# Patient Record
Sex: FEMALE | Race: WHITE | NOT HISPANIC OR LATINO
[De-identification: names, ages, dates, MRNs, and addresses within clinical notes are randomized per-mention and may not be internally consistent; named-entity substitution may affect disease eponyms.]

---

## 2017-01-20 ENCOUNTER — APPOINTMENT (OUTPATIENT)
Dept: CARDIOLOGY | Facility: CLINIC | Age: 72
End: 2017-01-20

## 2017-06-13 ENCOUNTER — APPOINTMENT (OUTPATIENT)
Dept: PODIATRY | Facility: CLINIC | Age: 72
End: 2017-06-13

## 2017-06-13 ENCOUNTER — OUTPATIENT (OUTPATIENT)
Dept: OUTPATIENT SERVICES | Facility: HOSPITAL | Age: 72
LOS: 1 days | Discharge: HOME | End: 2017-06-13

## 2017-06-28 DIAGNOSIS — B35.1 TINEA UNGUIUM: ICD-10-CM

## 2018-02-27 ENCOUNTER — INPATIENT (INPATIENT)
Facility: HOSPITAL | Age: 73
LOS: 2 days | Discharge: HOME | End: 2018-03-02
Attending: INTERNAL MEDICINE | Admitting: INTERNAL MEDICINE

## 2018-02-27 VITALS
TEMPERATURE: 99 F | DIASTOLIC BLOOD PRESSURE: 72 MMHG | RESPIRATION RATE: 18 BRPM | WEIGHT: 178.57 LBS | HEART RATE: 95 BPM | SYSTOLIC BLOOD PRESSURE: 150 MMHG | OXYGEN SATURATION: 96 %

## 2018-02-27 DIAGNOSIS — Z98.890 OTHER SPECIFIED POSTPROCEDURAL STATES: Chronic | ICD-10-CM

## 2018-02-27 LAB
ALBUMIN SERPL ELPH-MCNC: 3.4 G/DL — SIGNIFICANT CHANGE UP (ref 3–5.5)
ALP SERPL-CCNC: 107 U/L — SIGNIFICANT CHANGE UP (ref 30–115)
ALT FLD-CCNC: 16 U/L — SIGNIFICANT CHANGE UP (ref 0–41)
ANION GAP SERPL CALC-SCNC: 10 MMOL/L — SIGNIFICANT CHANGE UP (ref 7–14)
APTT BLD: 30.3 SEC — SIGNIFICANT CHANGE UP (ref 27–39.2)
AST SERPL-CCNC: 47 U/L — HIGH (ref 0–41)
BILIRUB SERPL-MCNC: 1 MG/DL — SIGNIFICANT CHANGE UP (ref 0.2–1.2)
BUN SERPL-MCNC: 23 MG/DL — HIGH (ref 10–20)
CALCIUM SERPL-MCNC: 8.9 MG/DL — SIGNIFICANT CHANGE UP (ref 8.5–10.1)
CHLORIDE SERPL-SCNC: 100 MMOL/L — SIGNIFICANT CHANGE UP (ref 98–110)
CK MB CFR SERPL CALC: 41.4 NG/ML — HIGH (ref 0.6–6.3)
CO2 SERPL-SCNC: 23 MMOL/L — SIGNIFICANT CHANGE UP (ref 17–32)
CREAT SERPL-MCNC: 1.2 MG/DL — SIGNIFICANT CHANGE UP (ref 0.7–1.5)
GLUCOSE SERPL-MCNC: 163 MG/DL — HIGH (ref 70–110)
INR BLD: 0.98 RATIO — SIGNIFICANT CHANGE UP (ref 0.65–1.3)
INR BLD: 1.01 RATIO — SIGNIFICANT CHANGE UP (ref 0.65–1.3)
LACTATE SERPL-SCNC: 1.5 MMOL/L — SIGNIFICANT CHANGE UP (ref 0.5–2.2)
POTASSIUM SERPL-MCNC: 5 MMOL/L — SIGNIFICANT CHANGE UP (ref 3.5–5)
POTASSIUM SERPL-SCNC: 5 MMOL/L — SIGNIFICANT CHANGE UP (ref 3.5–5)
PROT SERPL-MCNC: 6.9 G/DL — SIGNIFICANT CHANGE UP (ref 6–8)
PROTHROM AB SERPL-ACNC: 10.6 SEC — SIGNIFICANT CHANGE UP (ref 9.95–12.87)
PROTHROM AB SERPL-ACNC: 10.9 SEC — SIGNIFICANT CHANGE UP (ref 9.95–12.87)
SODIUM SERPL-SCNC: 133 MMOL/L — LOW (ref 135–146)
TROPONIN I SERPL-MCNC: 9.1 NG/ML — CRITICAL HIGH (ref 0–0.05)

## 2018-02-27 RX ORDER — DEXTROSE 50 % IN WATER 50 %
25 SYRINGE (ML) INTRAVENOUS ONCE
Qty: 0 | Refills: 0 | Status: DISCONTINUED | OUTPATIENT
Start: 2018-02-27 | End: 2018-03-02

## 2018-02-27 RX ORDER — INSULIN GLARGINE 100 [IU]/ML
15 INJECTION, SOLUTION SUBCUTANEOUS AT BEDTIME
Qty: 0 | Refills: 0 | Status: DISCONTINUED | OUTPATIENT
Start: 2018-02-27 | End: 2018-03-02

## 2018-02-27 RX ORDER — CLOPIDOGREL BISULFATE 75 MG/1
300 TABLET, FILM COATED ORAL ONCE
Qty: 0 | Refills: 0 | Status: COMPLETED | OUTPATIENT
Start: 2018-02-27 | End: 2018-02-27

## 2018-02-27 RX ORDER — ASPIRIN/CALCIUM CARB/MAGNESIUM 324 MG
325 TABLET ORAL ONCE
Qty: 0 | Refills: 0 | Status: COMPLETED | OUTPATIENT
Start: 2018-02-27 | End: 2018-02-27

## 2018-02-27 RX ORDER — DEXTROSE 50 % IN WATER 50 %
12.5 SYRINGE (ML) INTRAVENOUS ONCE
Qty: 0 | Refills: 0 | Status: DISCONTINUED | OUTPATIENT
Start: 2018-02-27 | End: 2018-03-02

## 2018-02-27 RX ORDER — HEPARIN SODIUM 5000 [USP'U]/ML
INJECTION INTRAVENOUS; SUBCUTANEOUS
Qty: 25000 | Refills: 0 | Status: DISCONTINUED | OUTPATIENT
Start: 2018-02-27 | End: 2018-02-28

## 2018-02-27 RX ORDER — INSULIN LISPRO 100/ML
5 VIAL (ML) SUBCUTANEOUS
Qty: 0 | Refills: 0 | Status: DISCONTINUED | OUTPATIENT
Start: 2018-02-27 | End: 2018-03-02

## 2018-02-27 RX ORDER — GLUCAGON INJECTION, SOLUTION 0.5 MG/.1ML
1 INJECTION, SOLUTION SUBCUTANEOUS ONCE
Qty: 0 | Refills: 0 | Status: DISCONTINUED | OUTPATIENT
Start: 2018-02-27 | End: 2018-03-02

## 2018-02-27 RX ORDER — METOPROLOL TARTRATE 50 MG
25 TABLET ORAL
Qty: 0 | Refills: 0 | Status: DISCONTINUED | OUTPATIENT
Start: 2018-02-27 | End: 2018-03-02

## 2018-02-27 RX ORDER — ALBUTEROL 90 UG/1
2 AEROSOL, METERED ORAL EVERY 6 HOURS
Qty: 0 | Refills: 0 | Status: DISCONTINUED | OUTPATIENT
Start: 2018-02-27 | End: 2018-03-02

## 2018-02-27 RX ORDER — DEXTROSE 50 % IN WATER 50 %
1 SYRINGE (ML) INTRAVENOUS ONCE
Qty: 0 | Refills: 0 | Status: DISCONTINUED | OUTPATIENT
Start: 2018-02-27 | End: 2018-03-02

## 2018-02-27 RX ORDER — ATORVASTATIN CALCIUM 80 MG/1
80 TABLET, FILM COATED ORAL AT BEDTIME
Qty: 0 | Refills: 0 | Status: DISCONTINUED | OUTPATIENT
Start: 2018-02-27 | End: 2018-03-02

## 2018-02-27 RX ORDER — CLOPIDOGREL BISULFATE 75 MG/1
75 TABLET, FILM COATED ORAL DAILY
Qty: 0 | Refills: 0 | Status: DISCONTINUED | OUTPATIENT
Start: 2018-02-27 | End: 2018-03-02

## 2018-02-27 RX ORDER — HEPARIN SODIUM 5000 [USP'U]/ML
5000 INJECTION INTRAVENOUS; SUBCUTANEOUS ONCE
Qty: 0 | Refills: 0 | Status: COMPLETED | OUTPATIENT
Start: 2018-02-27 | End: 2018-02-27

## 2018-02-27 RX ORDER — SODIUM CHLORIDE 9 MG/ML
1000 INJECTION, SOLUTION INTRAVENOUS
Qty: 0 | Refills: 0 | Status: DISCONTINUED | OUTPATIENT
Start: 2018-02-27 | End: 2018-03-02

## 2018-02-27 RX ORDER — ASPIRIN/CALCIUM CARB/MAGNESIUM 324 MG
81 TABLET ORAL DAILY
Qty: 0 | Refills: 0 | Status: DISCONTINUED | OUTPATIENT
Start: 2018-02-27 | End: 2018-03-02

## 2018-02-27 RX ADMIN — HEPARIN SODIUM 5000 UNIT(S): 5000 INJECTION INTRAVENOUS; SUBCUTANEOUS at 21:22

## 2018-02-27 RX ADMIN — HEPARIN SODIUM 1000 UNIT(S)/HR: 5000 INJECTION INTRAVENOUS; SUBCUTANEOUS at 21:22

## 2018-02-27 RX ADMIN — Medication 325 MILLIGRAM(S): at 18:39

## 2018-02-27 RX ADMIN — CLOPIDOGREL BISULFATE 300 MILLIGRAM(S): 75 TABLET, FILM COATED ORAL at 21:58

## 2018-02-27 NOTE — ED PROVIDER NOTE - ATTENDING CONTRIBUTION TO CARE
I have personally evaluated this patient. I have seen this patient with a medical scribe, please see PROGRESS NOTE for my contribution to care. I have reviewed scribe notes which are accurate.

## 2018-02-27 NOTE — ED ADULT NURSE NOTE - OBJECTIVE STATEMENT
Pt presents to the ED for C/O intermittent chest pain for two days, non radiating. States EMS gave her two baby aspirin prior to arrival.

## 2018-02-27 NOTE — ED PROVIDER NOTE - NS ED ROS FT
Review of Systems    Constitutional: (-) fever  Cardiovascular: (+) chest pain, (-) syncope  Respiratory: (+) cough, (-) shortness of breath  Gastrointestinal: (-) vomiting, (-) diarrhea  Musculoskeletal: (-) neck pain, (-) back pain  Integumentary: (-) rash, (-) edema  Neurological: (-) headache, (-) altered mental status

## 2018-02-27 NOTE — CONSULT NOTE ADULT - SUBJECTIVE AND OBJECTIVE BOX
CHIEF COMPLAINT:Patient is a 72y old  Female who presents with a chief complaint of Chest pain (27 Feb 2018 21:43)      HPI:  73 y/o F with PMH COPD, DM, HTN, DLD presents with intermittent pressure like substernal CP, non radiating, with sweating,with nausea and 4 episodes of non bloody non bilious vomiting, x 2 days with sporadic cough and  CP is worse with coughing. She also complains of SOB. She denies abdominal pain, back pain, rash, leg pain/swelling, difficulty walking, recent travel, sick contacts, hx MI/cardiac disease. She presented to Dr. Agata foley her PMD today who sent her to ED for further eval.   Patient dose not have any chest pain currently. (27 Feb 2018 21:43)      PAST MEDICAL & SURGICAL HISTORY:  Arthritis  COPD (chronic obstructive pulmonary disease)  Asthma  Osteoporosis  DM (diabetes mellitus)  HTN (hypertension)  History of hip surgery  H/O shoulder surgery  History of cholecystectomy          FAMILY HISTORY:none    Allergies    No Known Allergies        	  Home Medications:  albuterol:  (27 Feb 2018 22:01)  diclofenac 3% topical gel: Apply topically to affected area 2 times a day, As Needed (27 Feb 2018 22:01)  gabapentin:  (27 Feb 2018 22:01)  Linzess 72 mcg oral capsule: 1 cap(s) orally once a day (27 Feb 2018 22:01)  metFORMIN:  (27 Feb 2018 22:01)  oxyCODONE 15 mg oral tablet: 1 tab(s) orally 3 times a day (27 Feb 2018 22:01)  Viberzi 75 mg oral tablet: 1 tab(s) orally 2 times a day (27 Feb 2018 22:01)    MEDICATIONS  (STANDING):  aspirin  chewable 81 milliGRAM(s) Oral daily  atorvastatin 80 milliGRAM(s) Oral at bedtime  clopidogrel Tablet 75 milliGRAM(s) Oral daily  dextrose 5%. 1000 milliLiter(s) (50 mL/Hr) IV Continuous <Continuous>  dextrose 50% Injectable 12.5 Gram(s) IV Push once  dextrose 50% Injectable 25 Gram(s) IV Push once  dextrose 50% Injectable 25 Gram(s) IV Push once  heparin  Infusion.  Unit(s)/Hr (10 mL/Hr) IV Continuous <Continuous>  insulin glargine Injectable (LANTUS) 15 Unit(s) SubCutaneous at bedtime  insulin lispro Injectable (HumaLOG) 5 Unit(s) SubCutaneous before breakfast  insulin lispro Injectable (HumaLOG) 5 Unit(s) SubCutaneous before lunch  insulin lispro Injectable (HumaLOG) 5 Unit(s) SubCutaneous before dinner  metoprolol     tartrate 25 milliGRAM(s) Oral two times a day    MEDICATIONS  (PRN):  ALBUTerol    90 MICROgram(s) HFA Inhaler 2 Puff(s) Inhalation every 6 hours PRN Shortness of Breath and/or Wheezing  dextrose Gel 1 Dose(s) Oral once PRN Blood Glucose LESS THAN 70 milliGRAM(s)/deciliter  glucagon  Injectable 1 milliGRAM(s) IntraMuscular once PRN Glucose LESS THAN 70 milligrams/deciliter              SOCIAL HISTORY:    [x ] Former Smoker        REVIEW OF SYSTEMS:    Patient denies chest pain, shortness of breath, dyspnea on exertion, palpitations.     PHYSICAL EXAM:  T(C): 36.1 (02-27-18 @ 22:20), Max: 37 (02-27-18 @ 16:34)  HR: 87 (02-27-18 @ 22:20) (87 - 95)  BP: 114/59 (02-27-18 @ 22:20) (107/67 - 150/72)  RR: 18 (02-27-18 @ 22:20) (18 - 18)  SpO2: 97% (02-27-18 @ 22:20) (95% - 97%)  Wt(kg): --  I&O's Summary        General Appearance: Normal	  Cardiovascular: Normal S1 S2, No JVD, No murmurs, No edema  Respiratory: Lungs clear to auscultation	  Psychiatry: A & O x 3, Mood & affect appropriate  Gastrointestinal:  Soft, Non-tender  Skin: No rashes, No ecchymoses, No cyanosis	  Neurologic: Non-focal  Extremities: Normal range of motion, No clubbing, cyanosis or edema  Vascular: Peripheral pulses palpable 2+ bilaterally        LABS:	 	      02-27    133<L>  |  100  |  23<H>  ----------------------------<  163<H>  5.0   |  23  |  1.2    Ca    8.9      27 Feb 2018 18:43    TPro  6.9  /  Alb  3.4  /  TBili  1.0  /  DBili  x   /  AST  47<H>  /  ALT  16  /  AlkPhos  107  02-27          CARDIAC MARKERS:  Troponin I, Serum: 9.10 ng/mL (02-27 @ 18:43)    CKMB Units: 41.4 ng/mL (02.27.18 @ 18:43)  	    ECG:    < from: 12 Lead ECG (02.27.18 @ 16:41) >  Diagnosis Line Normal sinus rhythm @ 91  Anterior infarct , age undetermined  Abnormal ECG    < end of copied text >  	    	  ASSESSMENT/PLAN:   	  73 y/o F with PMH COPD, DM, HTN, DLD presents with intermittent pressure like substernal CP for 2 days.    1) NSTEMI    - Heparin gtt--> f/u PTT  -plavix loading 300mg and daily 75 mg   - loading and 81 mg daily  -Statin+ b-blocker  -NPO after-midnight for cardiac cath tomorrow  -trend cardiac enzyme  -2Decho    #DM:   -patient can't remember her home meds  -start patient on insulin S/C--> f/u FS    #H/o COPD/Asthma:  stable now  albuterol PRN

## 2018-02-27 NOTE — ED ADULT NURSE NOTE - PMH
Arthritis    Asthma    COPD (chronic obstructive pulmonary disease)    DM (diabetes mellitus)    HTN (hypertension)    Osteoporosis

## 2018-02-27 NOTE — ED PROVIDER NOTE - PROGRESS NOTE DETAILS
still without CP. EKG with STD. pending labs. Case discussed with Dr. Jordan cardiology aware that pt will be admitted. Agrees with management, will see pt in hospital. Attending note:  I personally evaluated the patient. I reviewed the Physician Assistant’s note (as assigned above), and agree with the findings and plan except as documented in my note.   71 y/o F with HX of arthritis, osteoporosis, COPD, asthma, DM, HTN, now with a 3 day HX of CP. Pain is intermittent. Last episode was last night. No CP at present. No fever, chills, nausea, or vomiting. + Chronic cough.   Physical exam: Pt non-toxic, well appearing, elderly female. Pink conjunctiva, anicteric. Neck supple, no crepitus, no JVD. RRR, no murmur, equal radial pulses b/l. Lungs CTAB. Abdomen soft, NT/ND. No edema. No focal neuro deficits.  Plan- Labs, imaging, reassess.

## 2018-02-27 NOTE — H&P ADULT - ASSESSMENT
73 y/o F with PMH COPD, DM, HTN, DLD presents with intermittent pressure like substernal CP.    #C.P due to NSTEMI  -patient was seen by cardiac fellow  - Heparin gtt--> f/u PTT  -plavix loading and daily  - loading and 81 mg daily  -Statin+ b-blocker  -NPO after-midnight for cardiac cath tomorrow  -trend cardiac enzyme  -2Decho    #DM:   -patient can't remember her home meds  -start patient on insulin S/C--> f/u FS    #H/o COPD/Asthma:  stable now  albuterol PRN    Patient can't remember her home medications completely, medication was inserted as per printed list from PMD office, please double check medications in the morning with her pharmacy.

## 2018-02-27 NOTE — H&P ADULT - HISTORY OF PRESENT ILLNESS
71 y/o F with PMH COPD, DM, HTN, DLD presents with intermittent pressure like substernal CP, non radiating, with sweating,with nausea and 4 episodes of non bloody non bilious vomiting, x 2-3 days with sporadic cough and  CP is worse with coughing. She also complains of SOB. She denies abdominal pain, back pain, rash, leg pain/swelling, difficulty walking, recent travel, sick contacts, hx MI/cardiac disease. She presented to Dr. Agata foley her PMD today who sent her to ED for further eval. He recommends admission with dr. lucia as cardiologist.  Patient dose not have any chest pain currently.

## 2018-02-27 NOTE — ED PROVIDER NOTE - PHYSICAL EXAMINATION
PHYSICAL EXAM:    GENERAL: Alert, appears stated age, well appearing, non-toxic  SKIN: Warm, pink and dry. MMM.   EYE: Normal lids/conjunctiva  ENT: Normal hearing, patent oropharynx  NECK: +supple. No meningismus, or JVD, +Trachea midline.  Pulm: Bilateral BS, normal resp effort, no wheezes, stridor, or retractions  CV: RRR, no M/R/G, 2+ pulses throughout. no TTP of precordium   Abd: soft, non-tender, non-distended, no hepatosplenomegaly. no CVA tenderness.   Mskel: no erythema, cyanosis, edema  Neuro: AAOx3, no motor deficit. normal gait.

## 2018-02-27 NOTE — H&P ADULT - NSHPPHYSICALEXAM_GEN_ALL_CORE
Vital Signs Last 24 Hrs  T(C): 36.4 (27 Feb 2018 19:38), Max: 37 (27 Feb 2018 16:34)  T(F): 97.5 (27 Feb 2018 19:38), Max: 98.6 (27 Feb 2018 16:34)  HR: 94 (27 Feb 2018 19:38) (94 - 95)  BP: 107/67 (27 Feb 2018 19:38) (107/67 - 150/72)  BP(mean): --  RR: 18 (27 Feb 2018 19:38) (18 - 18)  SpO2: 95% (27 Feb 2018 19:38) (95% - 96%)    Appearance: Normal	  HEENT:   Normal oral mucosa, PERRL, EOMI	  Lymphatic: No lymphadenopathy  Cardiovascular: Normal S1 S2, No JVD, No murmurs, No edema  Respiratory: Lungs clear to auscultation	  Psychiatry: A & O x 3, Mood & affect appropriate  Gastrointestinal:  Soft, Non-tender, + BS	  Skin: No rashes, No ecchymoses, No cyanosis	  Neurologic: Non-focal, A&Ox3, nonfocal, CORMIER x 4  Extremities: Normal range of motion, No clubbing, cyanosis or edema  Vascular: Peripheral pulses palpable 2+ bilaterally

## 2018-02-27 NOTE — ED PROVIDER NOTE - CARE PLAN
Principal Discharge DX:	Chest pain Principal Discharge DX:	Acute coronary syndrome  Secondary Diagnosis:	DM (diabetes mellitus)  Secondary Diagnosis:	HTN (hypertension)

## 2018-02-27 NOTE — H&P ADULT - ATTENDING COMMENTS
Pt seen in ICU  was sent from my office earlier because of recurrent chest pains, sweats and nausea for few days  H/O DM, HTN, OA, Low back pain,   No prior cardiac issues  ED w/u showed NSTEMI put in CCU, IV  heparin statin, plavix, B blockers  Asked Dr Gan cardiology to see pt.  Cath/PCI upto cardiology

## 2018-02-27 NOTE — ED PROVIDER NOTE - OBJECTIVE STATEMENT
73 y/o F with PMH COPD, DM, HTN< OA presents with intermittent substernal CP, no current CP, nausea, 4 episodes of non bloody non bilious vomiting, x 3 days. +cough x mos and CP is worse with coughing. no SOB, HERNANDEZ, abdominal pain, back pain, rash, leg pain/swelling, difficulty walking, recent travel, sick contacts, hx MI/cardiac disease. She presented to Dr. Agata foley her PMD today who sent her to ED for further eval. He recommends admission with dr. pimentel as cardiologist.

## 2018-02-27 NOTE — H&P ADULT - NSHPLABSRESULTS_GEN_ALL_CORE
BMP: 02-27-18 @ 18:43  133 | 100 | 23   -----------------< 163  5.0  | 23 | 1.2  eGFR(AA): 50, eGFR (non-AA): 43  Ca 8.9, Mg --, P -- BMP: 02-27-18 @ 18:43  133 | 100 | 23   -----------------< 163  5.0  | 23 | 1.2  eGFR(AA): 50, eGFR (non-AA): 43  Ca 8.9, Mg --, P --      EKG: no ischemic changes    Troponin:9  CKMB: 41

## 2018-02-28 LAB
ALBUMIN SERPL ELPH-MCNC: 3.3 G/DL — SIGNIFICANT CHANGE UP (ref 3–5.5)
ALP SERPL-CCNC: 102 U/L — SIGNIFICANT CHANGE UP (ref 30–115)
ALT FLD-CCNC: 16 U/L — SIGNIFICANT CHANGE UP (ref 0–41)
ANION GAP SERPL CALC-SCNC: 8 MMOL/L — SIGNIFICANT CHANGE UP (ref 7–14)
APPEARANCE UR: (no result)
APTT BLD: 35.3 SEC — SIGNIFICANT CHANGE UP (ref 27–39.2)
APTT BLD: 37.8 SEC — SIGNIFICANT CHANGE UP (ref 27–39.2)
AST SERPL-CCNC: 45 U/L — HIGH (ref 0–41)
BACTERIA # UR AUTO: (no result) /HPF
BASOPHILS # BLD AUTO: 0.03 K/UL — SIGNIFICANT CHANGE UP (ref 0–0.2)
BASOPHILS NFR BLD AUTO: 0.3 % — SIGNIFICANT CHANGE UP (ref 0–1)
BILIRUB SERPL-MCNC: 1 MG/DL — SIGNIFICANT CHANGE UP (ref 0.2–1.2)
BILIRUB UR-MCNC: NEGATIVE — SIGNIFICANT CHANGE UP
BUN SERPL-MCNC: 22 MG/DL — HIGH (ref 10–20)
CALCIUM SERPL-MCNC: 9 MG/DL — SIGNIFICANT CHANGE UP (ref 8.5–10.1)
CHLORIDE SERPL-SCNC: 101 MMOL/L — SIGNIFICANT CHANGE UP (ref 98–110)
CHOLEST SERPL-MCNC: 166 MG/DL — SIGNIFICANT CHANGE UP (ref 100–200)
CK MB BLD-MCNC: 6 % — HIGH (ref 0–4)
CK MB BLD-MCNC: 7 % — HIGH (ref 0–4)
CK MB CFR SERPL CALC: 23.9 NG/ML — HIGH (ref 0.6–6.3)
CK MB CFR SERPL CALC: 32.5 NG/ML — HIGH (ref 0.6–6.3)
CK SERPL-CCNC: 375 U/L — HIGH (ref 0–225)
CK SERPL-CCNC: 447 U/L — HIGH (ref 0–225)
CO2 SERPL-SCNC: 24 MMOL/L — SIGNIFICANT CHANGE UP (ref 17–32)
COLOR SPEC: YELLOW — SIGNIFICANT CHANGE UP
CREAT SERPL-MCNC: 1.2 MG/DL — SIGNIFICANT CHANGE UP (ref 0.7–1.5)
DIFF PNL FLD: (no result)
EOSINOPHIL # BLD AUTO: 0.13 K/UL — SIGNIFICANT CHANGE UP (ref 0–0.7)
EOSINOPHIL NFR BLD AUTO: 1.4 % — SIGNIFICANT CHANGE UP (ref 0–8)
EPI CELLS # UR: (no result) /HPF
ESTIMATED AVERAGE GLUCOSE: 186 MG/DL — HIGH (ref 68–114)
GLUCOSE SERPL-MCNC: 184 MG/DL — HIGH (ref 70–110)
GLUCOSE UR QL: NEGATIVE MG/DL — SIGNIFICANT CHANGE UP
HBA1C BLD-MCNC: 8.1 % — HIGH (ref 4–5.6)
HCT VFR BLD CALC: 35.1 % — LOW (ref 37–47)
HDLC SERPL-MCNC: 39 MG/DL — LOW (ref 40–60)
HGB BLD-MCNC: 11.4 G/DL — LOW (ref 14–18)
IMM GRANULOCYTES NFR BLD AUTO: 0.2 % — SIGNIFICANT CHANGE UP (ref 0.1–0.3)
INR BLD: 1.05 RATIO — SIGNIFICANT CHANGE UP (ref 0.65–1.3)
KETONES UR-MCNC: NEGATIVE — SIGNIFICANT CHANGE UP
LEUKOCYTE ESTERASE UR-ACNC: (no result)
LIPID PNL WITH DIRECT LDL SERPL: 118 MG/DL — HIGH (ref 50–100)
LYMPHOCYTES # BLD AUTO: 2.9 K/UL — SIGNIFICANT CHANGE UP (ref 1.2–3.4)
LYMPHOCYTES # BLD AUTO: 31.1 % — SIGNIFICANT CHANGE UP (ref 20.5–51.1)
MAGNESIUM SERPL-MCNC: 2 MG/DL — SIGNIFICANT CHANGE UP (ref 1.8–2.4)
MCHC RBC-ENTMCNC: 26.6 PG — LOW (ref 27–31)
MCHC RBC-ENTMCNC: 32.5 G/DL — SIGNIFICANT CHANGE UP (ref 32–37)
MCV RBC AUTO: 81.8 FL — SIGNIFICANT CHANGE UP (ref 81–91)
MONOCYTES # BLD AUTO: 0.65 K/UL — HIGH (ref 0.1–0.6)
MONOCYTES NFR BLD AUTO: 7 % — SIGNIFICANT CHANGE UP (ref 1.7–9.3)
NEUTROPHILS # BLD AUTO: 5.6 K/UL — SIGNIFICANT CHANGE UP (ref 1.4–6.5)
NEUTROPHILS NFR BLD AUTO: 60 % — SIGNIFICANT CHANGE UP (ref 42.2–75.2)
NITRITE UR-MCNC: POSITIVE
PH UR: 7 — SIGNIFICANT CHANGE UP (ref 5–8)
PLATELET # BLD AUTO: 308 K/UL — SIGNIFICANT CHANGE UP (ref 130–400)
POTASSIUM SERPL-MCNC: 4.5 MMOL/L — SIGNIFICANT CHANGE UP (ref 3.5–5)
POTASSIUM SERPL-SCNC: 4.5 MMOL/L — SIGNIFICANT CHANGE UP (ref 3.5–5)
PROT SERPL-MCNC: 6.7 G/DL — SIGNIFICANT CHANGE UP (ref 6–8)
PROT UR-MCNC: (no result) MG/DL
PROTHROM AB SERPL-ACNC: 11.4 SEC — SIGNIFICANT CHANGE UP (ref 9.95–12.87)
RBC # BLD: 4.29 M/UL — SIGNIFICANT CHANGE UP (ref 4.2–5.4)
RBC # FLD: 13.8 % — SIGNIFICANT CHANGE UP (ref 11.5–14.5)
RBC CASTS # UR COMP ASSIST: (no result) /HPF
SODIUM SERPL-SCNC: 133 MMOL/L — LOW (ref 135–146)
SP GR SPEC: 1.01 — SIGNIFICANT CHANGE UP (ref 1.01–1.03)
TOTAL CHOLESTEROL/HDL RATIO MEASUREMENT: 4.3 RATIO — SIGNIFICANT CHANGE UP (ref 4–5.5)
TRIGL SERPL-MCNC: 109 MG/DL — SIGNIFICANT CHANGE UP (ref 40–150)
TROPONIN I SERPL-MCNC: 7.15 NG/ML — CRITICAL HIGH (ref 0–0.05)
TROPONIN I SERPL-MCNC: 9.4 NG/ML — CRITICAL HIGH (ref 0–0.05)
UROBILINOGEN FLD QL: 0.2 MG/DL — SIGNIFICANT CHANGE UP (ref 0.2–0.2)
WBC # BLD: 9.33 K/UL — SIGNIFICANT CHANGE UP (ref 4.8–10.8)
WBC # FLD AUTO: 9.33 K/UL — SIGNIFICANT CHANGE UP (ref 4.8–10.8)
WBC UR QL: (no result) /HPF

## 2018-02-28 RX ORDER — CEFTRIAXONE 500 MG/1
1 INJECTION, POWDER, FOR SOLUTION INTRAMUSCULAR; INTRAVENOUS EVERY 24 HOURS
Qty: 0 | Refills: 0 | Status: DISCONTINUED | OUTPATIENT
Start: 2018-03-01 | End: 2018-03-02

## 2018-02-28 RX ORDER — CEFTRIAXONE 500 MG/1
1 INJECTION, POWDER, FOR SOLUTION INTRAMUSCULAR; INTRAVENOUS ONCE
Qty: 0 | Refills: 0 | Status: COMPLETED | OUTPATIENT
Start: 2018-02-28 | End: 2018-02-28

## 2018-02-28 RX ORDER — CEFTRIAXONE 500 MG/1
INJECTION, POWDER, FOR SOLUTION INTRAMUSCULAR; INTRAVENOUS
Qty: 0 | Refills: 0 | Status: DISCONTINUED | OUTPATIENT
Start: 2018-02-28 | End: 2018-03-02

## 2018-02-28 RX ORDER — HEPARIN SODIUM 5000 [USP'U]/ML
1250 INJECTION INTRAVENOUS; SUBCUTANEOUS
Qty: 25000 | Refills: 0 | Status: DISCONTINUED | OUTPATIENT
Start: 2018-02-28 | End: 2018-02-28

## 2018-02-28 RX ORDER — HEPARIN SODIUM 5000 [USP'U]/ML
INJECTION INTRAVENOUS; SUBCUTANEOUS
Qty: 25000 | Refills: 0 | Status: DISCONTINUED | OUTPATIENT
Start: 2018-02-28 | End: 2018-02-28

## 2018-02-28 RX ADMIN — Medication 25 MILLIGRAM(S): at 05:09

## 2018-02-28 RX ADMIN — ATORVASTATIN CALCIUM 80 MILLIGRAM(S): 80 TABLET, FILM COATED ORAL at 22:05

## 2018-02-28 RX ADMIN — CLOPIDOGREL BISULFATE 75 MILLIGRAM(S): 75 TABLET, FILM COATED ORAL at 11:21

## 2018-02-28 RX ADMIN — CEFTRIAXONE 100 GRAM(S): 500 INJECTION, POWDER, FOR SOLUTION INTRAMUSCULAR; INTRAVENOUS at 11:21

## 2018-02-28 RX ADMIN — HEPARIN SODIUM 1500 UNIT(S)/HR: 5000 INJECTION INTRAVENOUS; SUBCUTANEOUS at 13:37

## 2018-02-28 RX ADMIN — HEPARIN SODIUM 1250 UNIT(S)/HR: 5000 INJECTION INTRAVENOUS; SUBCUTANEOUS at 06:29

## 2018-02-28 RX ADMIN — Medication 25 MILLIGRAM(S): at 19:03

## 2018-02-28 RX ADMIN — HEPARIN SODIUM 1250 UNIT(S)/HR: 5000 INJECTION INTRAVENOUS; SUBCUTANEOUS at 06:21

## 2018-02-28 RX ADMIN — Medication 81 MILLIGRAM(S): at 11:21

## 2018-02-28 RX ADMIN — INSULIN GLARGINE 15 UNIT(S): 100 INJECTION, SOLUTION SUBCUTANEOUS at 22:05

## 2018-02-28 NOTE — PROGRESS NOTE ADULT - SUBJECTIVE AND OBJECTIVE BOX
Pt seen in CCU,   preceding events reviewed.  Pain free today  IV Abx for a UTI also  For cardiac cath today          SOCIAL HISTORY:n/a    REVIEW OF SYSTEMS:    Constitutional: No fever, weight loss or fatigue  ENT:  No difficulty hearing, tinnitus, vertigo; No sinus or throat pain  Neck: No pain or stiffness  Respiratory: No cough, wheezing, chills or hemoptysis  Cardiovascular: No chest pain, palpitations, shortness of breath, dizziness or leg swelling  Gastrointestinal: No abdominal or epigastric pain. No nausea, vomiting or hematemesis; No diarrhea or constipation. No melena or hematochezia.  Neurological: No headaches, memory loss, loss of strength, numbness or tremors  Musculoskeletal: No joint pain or swelling; No muscle, back or extremity pain  Psychiatric: No depression, anxiety, mood swings or difficulty sleeping    MEDICATIONS  (STANDING):  aspirin  chewable 81 milliGRAM(s) Oral daily  atorvastatin 80 milliGRAM(s) Oral at bedtime  cefTRIAXone   IVPB      cefTRIAXone   IVPB 1 Gram(s) IV Intermittent once  clopidogrel Tablet 75 milliGRAM(s) Oral daily  dextrose 5%. 1000 milliLiter(s) (50 mL/Hr) IV Continuous <Continuous>  dextrose 50% Injectable 12.5 Gram(s) IV Push once  dextrose 50% Injectable 25 Gram(s) IV Push once  dextrose 50% Injectable 25 Gram(s) IV Push once  heparin  Infusion. 1250 Unit(s)/Hr (12.5 mL/Hr) IV Continuous <Continuous>  insulin glargine Injectable (LANTUS) 15 Unit(s) SubCutaneous at bedtime  insulin lispro Injectable (HumaLOG) 5 Unit(s) SubCutaneous before breakfast  insulin lispro Injectable (HumaLOG) 5 Unit(s) SubCutaneous before lunch  insulin lispro Injectable (HumaLOG) 5 Unit(s) SubCutaneous before dinner  metoprolol     tartrate 25 milliGRAM(s) Oral two times a day    MEDICATIONS  (PRN):  ALBUTerol    90 MICROgram(s) HFA Inhaler 2 Puff(s) Inhalation every 6 hours PRN Shortness of Breath and/or Wheezing  dextrose Gel 1 Dose(s) Oral once PRN Blood Glucose LESS THAN 70 milliGRAM(s)/deciliter  glucagon  Injectable 1 milliGRAM(s) IntraMuscular once PRN Glucose LESS THAN 70 milligrams/deciliter      Vital Signs Last 24 Hrs  T(C): 36.4 (2018 08:31), Max: 37 (2018 16:34)  T(F): 97.5 (2018 08:31), Max: 98.6 (2018 16:34)  HR: 64 (2018 10:07) (64 - 95)  BP: 111/58 (2018 10:07) (97/47 - 150/72)  BP(mean): 79 (2018 10:07) (66 - 105)  RR: 23 (2018 10:07) (18 - 36)  SpO2: 98% (2018 10:07) (90% - 100%)    PHYSICAL EXAM:    Constitutional: NAD, well-groomed, well-developed  HEENT: PERRLA, EOMI, Normal Hearing, MMM  Neck: No LAD, No JVD  Back: Normal spine flexure, No CVA tenderness  Respiratory: CTAB/L  Cardiovascular: S1 and S2, RRR, no M/G/R  Gastrointestinal: BS+, soft, NT/ND  Extremities: No peripheral edema  Vascular: 2+ peripheral pulses  Neurological: A/O x 3, no focal deficits    LABS:                        11.4   9.33  )-----------( 308      ( 2018 04:44 )             35.1     02-28    133<L>  |  101  |  22<H>  ----------------------------<  184<H>  4.5   |  24  |  1.2    Ca    9.0      2018 04:44  Mg     2.0         TPro  6.7  /  Alb  3.3  /  TBili  1.0  /  DBili  x   /  AST  45<H>  /  ALT  16  /  AlkPhos  102  02-28    PT/INR - ( 2018 04:44 )   PT: 11.40 sec;   INR: 1.05 ratio         PTT - ( 2018 04:44 )  PTT:35.3 sec  Urinalysis Basic - ( 2018 03:21 )    Color: Yellow / Appearance: Turbid / S.010 / pH: x  Gluc: x / Ketone: Negative  / Bili: Negative / Urobili: 0.2 mg/dL   Blood: x / Protein: Trace mg/dL / Nitrite: Positive   Leuk Esterase: Large / RBC: 5-10 /HPF / WBC 10-25 /HPF   Sq Epi: x / Non Sq Epi: Few /HPF / Bacteria: Few /HPF      Drug Screen Urine:n/a  Alcohol Level n/a        RADIOLOGY & ADDITIONAL STUDIES  Reviewed in PACS

## 2018-02-28 NOTE — PROGRESS NOTE ADULT - SUBJECTIVE AND OBJECTIVE BOX
PREOPERATIVE DAY OF PROCEDURE EVALUATION:  I have personally seen and examined the patient.  I agree with the history and physical which I have reviewed and noted any changes below.  (Signed electronically by Belkys Jordan MD)  02-28-18 @ 16:05                                              POST OPERATIVE PROCEDURAL DOCUMENTATION  PRE-OP DIAGNOSIS:    POST-OP DIAGNOSIS:    PROCEDURE:   LEFT HEART CATHERIZATION    Physician:  Belkys Jordan MD  Assistant:  MD    ANESTHESIA TYPE:  [ ] Sedation  [ ] Local/Regional  [  ]General Anesthesia    ESTIMATED BLOOD LOSS:      less than 10 mL    CONDITION  [ ] Good  [   ] Fair  [   ] Serious  [   ] Critical      SPECIMENS REMOVED (IF APPLICABLE):   None      IMPLANTS (IF APPLICABLE)      FINDINGS:    LEFT HEART CATHERIZATION                                    LVEF%:  LVEDP:    Left main    LAD:                        Diag:     Left Circumflex:    OM:      Right Cornary Artery:   RPDA:    RPL:       RIGHT HEART CATHERIZATION  PA:  PCW:  CO/CI:    PERCUTANEOUS CORONARY INTERVENTIONS:      COMPLICATIONS:  None      POST-OP DIAGNOSIS    [ ] Normal Coronary Arteries  [ ] Luminal Irregularities  [ ] Non-obstructive CAD        PLAN OF CARE      [ ] D/C Home today   [ ]  D/C in AM  [ ] Return to In-patient bed  [ ] Admit for observation  [ ] Return for staged procedure:  [ ] CT Surgery consult called  [ ]  Continue DAPT, B-blocker & Statin therapy  [ ]  Medical Therapy  [ ] Aggressive risk factor modification. The patient should follow a low fat and low calorie diet. PREOPERATIVE DAY OF PROCEDURE EVALUATION:  I have personally seen and examined the patient.  I agree with the history and physical which I have reviewed and noted any changes below.  (Signed electronically by Belkys Jordan MD)  02-28-18 @ 16:05                                              POST OPERATIVE PROCEDURAL DOCUMENTATION  PRE-OP DIAGNOSIS:    POST-OP DIAGNOSIS:    PROCEDURE:   LEFT HEART CATHERIZATION    Physician:  Belkys Jordan MD  Assistant:  MD    ANESTHESIA TYPE:  [ ] Sedation  [ X] Local/Regional  [  ]General Anesthesia    ESTIMATED BLOOD LOSS:      less than 10 mL    CONDITION  [ ] Good  [ X  ] Fair  [   ] Serious  [   ] Critical      SPECIMENS REMOVED (IF APPLICABLE):   None      IMPLANTS (IF APPLICABLE)      FINDINGS:    LEFT HEART CATHERIZATION                                    LVEF%: 55%  LVEDP:    Left main Normal    LAD:   Ostial 99%                     Ramus: Normal    Left Circumflex:  Normal  OM:      Right Cornary Artery:  prox 80% Mid 80%  RPDA:    RPL:           PERCUTANEOUS CORONARY INTERVENTIONS: Successful PCI of the LAD with CARLY, followed with kissing balloons in LAD/Ramus.      COMPLICATIONS:  None      POST-OP DIAGNOSIS    2 vessels diseases    [ ] Normal Coronary Arteries  [ ] Luminal Irregularities  [ ] Non-obstructive CAD        PLAN OF CARE      [ ] D/C Home today   [ ]  D/C in AM  [ X] Return to In-patient bed CCU  [ ] Admit for observation  [x ] Return for staged procedure: RCA in 4 weeks  [ ] CT Surgery consult called  [X ]  Continue DAPT, B-blocker & Statin therapy  [ ]  Medical Therapy  [ ] Aggressive risk factor modification. The patient should follow a low fat and low calorie diet.

## 2018-02-28 NOTE — PROVIDER CONTACT NOTE (OTHER) - SITUATION
MD aware initial PTT 30.3. As per md keep heparin at 1,000 units/hr and recheck at 3 am. will continue to monitor.

## 2018-02-28 NOTE — PROGRESS NOTE ADULT - ASSESSMENT
73 y/o F with PMH COPD, DM, HTN, DLD presents with intermittent pressure like substernal CP.    #Chest Pain  due to NSTEMI  -patient was seen by cardiology  - Heparin gtt--> f/u PTT  -plavix loading and daily  - loading and 81 mg daily  -Statin+ b-blocker  -plan for cardiac cath today  -trend cardiac enzyme      #DM:   -patient can't remember her home meds  -start patient on insulin S/C--> f/u FS    #H/o COPD/Asthma:  stable now  albuterol PRN    #Urinary tract infection;  +UA and symptomatic  started on ceftriaxone  follow UC and change abx with senstivities

## 2018-02-28 NOTE — PROGRESS NOTE ADULT - SUBJECTIVE AND OBJECTIVE BOX
LENGTH OF HOSPITAL STAY: 1d    CHIEF COMPLAINT:   Patient is a 72y old  Female who presents with a chief complaint of Chest pain (2018 21:43)    EVENTS OVER NIGHT;  c/o burning urination, no Chest pain,waiting to go to cath lab  HISTORY OF PRESENTING ILLNESS:    HPI:  73 y/o F with PMH COPD, DM, HTN, DLD presents with intermittent pressure like substernal CP, non radiating, with sweating,with nausea and 4 episodes of non bloody non bilious vomiting, x 2-3 days with sporadic cough and  CP is worse with coughing. She also complains of SOB. She denies abdominal pain, back pain, rash, leg pain/swelling, difficulty walking, recent travel, sick contacts, hx MI/cardiac disease. She presented to Dr. Agata foley her PMD today who sent her to ED for further eval. He recommends admission with dr. lucia as cardiologist.  Patient dose not have any chest pain currently. (2018 21:43)    PAST MEDICAL & SURGICAL HISTORY  PAST MEDICAL & SURGICAL HISTORY:  Arthritis  COPD (chronic obstructive pulmonary disease)  Asthma  Osteoporosis  DM (diabetes mellitus)  HTN (hypertension)  History of hip surgery  H/O shoulder surgery  History of cholecystectomy    SOCIAL HISTORY: ex smoker ,quit at age of 40    ALLERGIES:  No Known Allergies    Home Medications:  albuterol:  (2018 22:01)  diclofenac 3% topical gel: Apply topically to affected area 2 times a day, As Needed (2018 22:01)  gabapentin:  (2018 22:01)  Linzess 72 mcg oral capsule: 1 cap(s) orally once a day (2018 22:01)  metFORMIN:  (2018 22:01)  oxyCODONE 15 mg oral tablet: 1 tab(s) orally 3 times a day (2018 22:01)  Viberzi 75 mg oral tablet: 1 tab(s) orally 2 times a day (2018 22:01)      MEDICATIONS:  STANDING MEDICATIONS  aspirin  chewable 81 milliGRAM(s) Oral daily  atorvastatin 80 milliGRAM(s) Oral at bedtime  cefTRIAXone   IVPB      clopidogrel Tablet 75 milliGRAM(s) Oral daily  dextrose 5%. 1000 milliLiter(s) IV Continuous <Continuous>  dextrose 50% Injectable 12.5 Gram(s) IV Push once  dextrose 50% Injectable 25 Gram(s) IV Push once  dextrose 50% Injectable 25 Gram(s) IV Push once  heparin  Infusion. 1250 Unit(s)/Hr IV Continuous <Continuous>  insulin glargine Injectable (LANTUS) 15 Unit(s) SubCutaneous at bedtime  insulin lispro Injectable (HumaLOG) 5 Unit(s) SubCutaneous before breakfast  insulin lispro Injectable (HumaLOG) 5 Unit(s) SubCutaneous before lunch  insulin lispro Injectable (HumaLOG) 5 Unit(s) SubCutaneous before dinner  metoprolol     tartrate 25 milliGRAM(s) Oral two times a day    PRN MEDICATIONS  ALBUTerol    90 MICROgram(s) HFA Inhaler 2 Puff(s) Inhalation every 6 hours PRN  dextrose Gel 1 Dose(s) Oral once PRN  glucagon  Injectable 1 milliGRAM(s) IntraMuscular once PRN    VITALS:   T(F): 97.5  HR: 74  BP: 124/74  RR: 22  SpO2: 97%    LABS:                        11.4   9.33  )-----------( 308      ( 2018 04:44 )             35.1         133<L>  |  101  |  22<H>  ----------------------------<  184<H>  4.5   |  24  |  1.2    Ca    9.0      2018 04:44  Mg     2.0         TPro  6.7  /  Alb  3.3  /  TBili  1.0  /  DBili  x   /  AST  45<H>  /  ALT  16  /  AlkPhos  102      PT/INR - ( 2018 04:44 )   PT: 11.40 sec;   INR: 1.05 ratio         PTT - ( 2018 12:17 )  PTT:37.8 sec  Urinalysis Basic - ( 2018 03:21 )    Color: Yellow / Appearance: Turbid / S.010 / pH: x  Gluc: x / Ketone: Negative  / Bili: Negative / Urobili: 0.2 mg/dL   Blood: x / Protein: Trace mg/dL / Nitrite: Positive   Leuk Esterase: Large / RBC: 5-10 /HPF / WBC 10-25 /HPF   Sq Epi: x / Non Sq Epi: Few /HPF / Bacteria: Few /HPF        Creatine Kinase, Serum: 375 U/L <H> (18 @ 04:44)  Troponin I, Serum: 7.15 ng/mL <HH> (18 @ 04:44)  Creatine Kinase, Serum: 447 U/L <H> (18 @ 01:30)  Troponin I, Serum: 9.40 ng/mL <HH> (18 @ 01:30)  Troponin I, Serum: 9.10 ng/mL <HH> (18 @ 18:43)  Lactate, Blood: 1.5 mmol/L (18 @ 18:43)      CARDIAC MARKERS ( 2018 04:44 )  7.15 ng/mL / x     / 375 U/L / x     / 23.9 ng/mL  CARDIAC MARKERS ( 2018 01:30 )  9.40 ng/mL / x     / 447 U/L / x     / 32.5 ng/mL  CARDIAC MARKERS ( 2018 18:43 )  9.10 ng/mL / x     / x     / x     / 41.4 ng/mL      RADIOLOGY:< from: Xray Chest 1 View- PORTABLE-Routine (18 @ 05:12) >  Cardiomegaly with atelectasis.    < end of copied text >    < from: 12 Lead ECG (18 @ 07:25) >  Diagnosis Line Normal sinus rhythm  Normal ECG    < end of copied text >    < from: Transthoracic Echocardiogram (18 @ 09:25) >  1. Left ventricular ejection fraction, by visual estimation, is 45 to   50%.   2. Mid and apical anterior septum, mid and apical inferior septum, and   mid anterior segment are abnormal as described above.   3. Mild aortic regurgitation    < end of copied text >      PHYSICAL EXAM:  GEN: No acute distress  HEENT:   LUNGS: Clear to auscultation bilaterally   HEART: S1/S2 present. RRR.   ABD: Soft, non-tender, non-distended. Bowel sounds present  EXT:no LE edema  NEURO: AAOX3

## 2018-03-01 LAB
ANION GAP SERPL CALC-SCNC: 9 MMOL/L — SIGNIFICANT CHANGE UP (ref 7–14)
BASOPHILS # BLD AUTO: 0.04 K/UL — SIGNIFICANT CHANGE UP (ref 0–0.2)
BASOPHILS NFR BLD AUTO: 0.4 % — SIGNIFICANT CHANGE UP (ref 0–1)
BUN SERPL-MCNC: 25 MG/DL — HIGH (ref 10–20)
CALCIUM SERPL-MCNC: 9 MG/DL — SIGNIFICANT CHANGE UP (ref 8.5–10.1)
CHLORIDE SERPL-SCNC: 100 MMOL/L — SIGNIFICANT CHANGE UP (ref 98–110)
CK SERPL-CCNC: 225 U/L — SIGNIFICANT CHANGE UP (ref 0–225)
CO2 SERPL-SCNC: 25 MMOL/L — SIGNIFICANT CHANGE UP (ref 17–32)
CREAT SERPL-MCNC: 1.7 MG/DL — HIGH (ref 0.7–1.5)
EOSINOPHIL # BLD AUTO: 0.09 K/UL — SIGNIFICANT CHANGE UP (ref 0–0.7)
EOSINOPHIL NFR BLD AUTO: 0.8 % — SIGNIFICANT CHANGE UP (ref 0–8)
GLUCOSE SERPL-MCNC: 166 MG/DL — HIGH (ref 70–110)
HCT VFR BLD CALC: 35.4 % — LOW (ref 37–47)
HGB BLD-MCNC: 11.6 G/DL — LOW (ref 14–18)
IMM GRANULOCYTES NFR BLD AUTO: 0.4 % — HIGH (ref 0.1–0.3)
LYMPHOCYTES # BLD AUTO: 17.9 % — LOW (ref 20.5–51.1)
LYMPHOCYTES # BLD AUTO: 2.03 K/UL — SIGNIFICANT CHANGE UP (ref 1.2–3.4)
MAGNESIUM SERPL-MCNC: 2.1 MG/DL — SIGNIFICANT CHANGE UP (ref 1.8–2.4)
MCHC RBC-ENTMCNC: 27.1 PG — SIGNIFICANT CHANGE UP (ref 27–31)
MCHC RBC-ENTMCNC: 32.8 G/DL — SIGNIFICANT CHANGE UP (ref 32–37)
MCV RBC AUTO: 82.7 FL — SIGNIFICANT CHANGE UP (ref 81–91)
MONOCYTES # BLD AUTO: 0.8 K/UL — HIGH (ref 0.1–0.6)
MONOCYTES NFR BLD AUTO: 7.1 % — SIGNIFICANT CHANGE UP (ref 1.7–9.3)
NEUTROPHILS # BLD AUTO: 8.31 K/UL — HIGH (ref 1.4–6.5)
NEUTROPHILS NFR BLD AUTO: 73.4 % — SIGNIFICANT CHANGE UP (ref 42.2–75.2)
PLATELET # BLD AUTO: 300 K/UL — SIGNIFICANT CHANGE UP (ref 130–400)
POTASSIUM SERPL-MCNC: 4.2 MMOL/L — SIGNIFICANT CHANGE UP (ref 3.5–5)
POTASSIUM SERPL-SCNC: 4.2 MMOL/L — SIGNIFICANT CHANGE UP (ref 3.5–5)
RBC # BLD: 4.28 M/UL — SIGNIFICANT CHANGE UP (ref 4.2–5.4)
RBC # FLD: 13.8 % — SIGNIFICANT CHANGE UP (ref 11.5–14.5)
SODIUM SERPL-SCNC: 134 MMOL/L — LOW (ref 135–146)
TROPONIN I SERPL-MCNC: 4.37 NG/ML — CRITICAL HIGH (ref 0–0.05)
WBC # BLD: 11.31 K/UL — HIGH (ref 4.8–10.8)
WBC # FLD AUTO: 11.31 K/UL — HIGH (ref 4.8–10.8)

## 2018-03-01 RX ADMIN — Medication 81 MILLIGRAM(S): at 11:16

## 2018-03-01 RX ADMIN — ATORVASTATIN CALCIUM 80 MILLIGRAM(S): 80 TABLET, FILM COATED ORAL at 22:38

## 2018-03-01 RX ADMIN — Medication 5 UNIT(S): at 11:25

## 2018-03-01 RX ADMIN — INSULIN GLARGINE 15 UNIT(S): 100 INJECTION, SOLUTION SUBCUTANEOUS at 22:37

## 2018-03-01 RX ADMIN — CLOPIDOGREL BISULFATE 75 MILLIGRAM(S): 75 TABLET, FILM COATED ORAL at 11:16

## 2018-03-01 RX ADMIN — Medication 25 MILLIGRAM(S): at 05:19

## 2018-03-01 RX ADMIN — Medication 25 MILLIGRAM(S): at 17:32

## 2018-03-01 RX ADMIN — Medication 5 UNIT(S): at 08:40

## 2018-03-01 RX ADMIN — CEFTRIAXONE 100 GRAM(S): 500 INJECTION, POWDER, FOR SOLUTION INTRAMUSCULAR; INTRAVENOUS at 11:39

## 2018-03-01 NOTE — PROGRESS NOTE ADULT - ASSESSMENT
73 y/o F with PMH COPD, DM, HTN, DLD presents with intermittent pressure like substernal CP.    #Chest Pain  due to NSTEMI  -patient was seen by cardiology  - s/p cradiac cath and stent in   2/28  -cw aspirin,plavix ,statin and BB    #DM:   -patient can't remember her home meds  -start patient on insulin S/C--> f/u FS    #H/o COPD/Asthma:  stable now  albuterol PRN    #Urinary tract infection;  +UA and symptomatic  started on ceftriaxone  follow UC and change abx with senstivities 73 y/o F with PMH COPD, DM, HTN, DLD presents with intermittent pressure like substernal CP.    #Chest Pain  due to NSTEMI  -patient was seen by cardiology  - s/p cradiac cath and stent in   [facility] in [facility] 2/28  -cw aspirin,plavix ,statin and BB  -plan of cath and stenting of RCA in 4-6 weeks  -follow with DR Buck    #DM:   -patient can't remember her home meds  -start patient on insulin S/C--> f/u FS    #H/o COPD/Asthma:  stable now  albuterol PRN    #Urinary tract infection;  +UA and symptomatic  started on ceftriaxone  follow UC and change abx with senstivities      #ADDENDUM;  pt is being down graded to 3-c tele as per dr BUCK  anticipated to be discharged in 24 hrs

## 2018-03-01 NOTE — PROGRESS NOTE ADULT - ASSESSMENT
NSTEMI  s/p cath and PCI  CAD  DM  UTI      treatment in progress  for transfer to tele today  anticipate discharge tomorrow if stable

## 2018-03-01 NOTE — PROGRESS NOTE ADULT - SUBJECTIVE AND OBJECTIVE BOX
SUBJ:  Post PCI, denies any chest pain or short of breath.    MEDICATIONS  (STANDING):  aspirin  chewable 81 milliGRAM(s) Oral daily  atorvastatin 80 milliGRAM(s) Oral at bedtime  cefTRIAXone   IVPB      cefTRIAXone   IVPB 1 Gram(s) IV Intermittent every 24 hours  clopidogrel Tablet 75 milliGRAM(s) Oral daily  dextrose 5%. 1000 milliLiter(s) (50 mL/Hr) IV Continuous <Continuous>  dextrose 50% Injectable 12.5 Gram(s) IV Push once  dextrose 50% Injectable 25 Gram(s) IV Push once  dextrose 50% Injectable 25 Gram(s) IV Push once  insulin glargine Injectable (LANTUS) 15 Unit(s) SubCutaneous at bedtime  insulin lispro Injectable (HumaLOG) 5 Unit(s) SubCutaneous before breakfast  insulin lispro Injectable (HumaLOG) 5 Unit(s) SubCutaneous before lunch  insulin lispro Injectable (HumaLOG) 5 Unit(s) SubCutaneous before dinner  metoprolol     tartrate 25 milliGRAM(s) Oral two times a day    MEDICATIONS  (PRN):  ALBUTerol    90 MICROgram(s) HFA Inhaler 2 Puff(s) Inhalation every 6 hours PRN Shortness of Breath and/or Wheezing  dextrose Gel 1 Dose(s) Oral once PRN Blood Glucose LESS THAN 70 milliGRAM(s)/deciliter  glucagon  Injectable 1 milliGRAM(s) IntraMuscular once PRN Glucose LESS THAN 70 milligrams/deciliter            Vital Signs Last 24 Hrs  T(C): 36.2 (01 Mar 2018 04:00), Max: 36.2 (28 Feb 2018 20:00)  T(F): 97.2 (01 Mar 2018 04:00), Max: 97.2 (28 Feb 2018 20:00)  HR: 61 (01 Mar 2018 08:00) (60 - 82)  BP: 100/59 (01 Mar 2018 08:00) (100/59 - 125/73)  BP(mean): 83 (01 Mar 2018 04:00) (65 - 91)  RR: 25 (01 Mar 2018 08:00) (13 - 31)  SpO2: 96% (28 Feb 2018 18:32) (96% - 98%)     REVIEW OF SYSTEMS:  CONSTITUTIONAL: No fever, weight loss, or fatigue  CARDIOLOGY: PAtient denies chest pain, shortness of breath or syncopal episodes.   RESPIRATORY: denies shortness of breath, wheezeing.   NEUROLOGICAL: NO weakness, no focal deficits to report.  ENDOCRINOLOGICAL: no recent change in diabetic medications.   GI: no BRBPR, no N,V,diarrhea.    PSYCHIATRY: normal mood and affect  HEENT: no nasal discharge, no ecchymosis  SKIN: no ecchymosis, no breakdown  MUSCULOSKELETAL: Full range of motion x4.        PHYSICAL EXAM:  · CONSTITUTIONAL:	Well-developed, well nourished    BMI-  ·RESPIRATORY:   airway patent; breath sounds equal; good air movement; respirations non-labored; clear to auscultation bilaterally; no chest wall tenderness; no intercostal retractions; no rales,rhonchi or wheeze  · CARDIOVASCULAR	regular rate and rhythm  no rub  no murmur  normal PMI  · EXTREMITIES: No cyanosis, clubbing or edema  · VASCULAR: 	Equal and normal pulses (carotid, femoral, dorsalis pedis)  	  TELEMETRY: No events    ECG: NSR, anterior ischemia    TTE:    LABS:                        11.6   11.31 )-----------( 300      ( 01 Mar 2018 05:28 )             35.4     03-01    134<L>  |  100  |  25<H>  ----------------------------<  166<H>  4.2   |  25  |  1.7<H>    Ca    9.0      01 Mar 2018 05:28  Mg     2.1     03-01    TPro  6.7  /  Alb  3.3  /  TBili  1.0  /  DBili  x   /  AST  45<H>  /  ALT  16  /  AlkPhos  102  02-28    CARDIAC MARKERS ( 01 Mar 2018 05:28 )  4.37 ng/mL / x     / 225 U/L / x     / x      CARDIAC MARKERS ( 28 Feb 2018 04:44 )  7.15 ng/mL / x     / 375 U/L / x     / 23.9 ng/mL  CARDIAC MARKERS ( 28 Feb 2018 01:30 )  9.40 ng/mL / x     / 447 U/L / x     / 32.5 ng/mL  CARDIAC MARKERS ( 27 Feb 2018 18:43 )  9.10 ng/mL / x     / x     / x     / 41.4 ng/mL      PT/INR - ( 28 Feb 2018 04:44 )   PT: 11.40 sec;   INR: 1.05 ratio         PTT - ( 28 Feb 2018 12:17 )  PTT:37.8 sec    I&O's Summary    28 Feb 2018 07:01  -  01 Mar 2018 07:00  --------------------------------------------------------  IN: 275 mL / OUT: 1200 mL / NET: -925 mL      BNP  RADIOLOGY & ADDITIONAL STUDIES:    IMPRESSION AND PLAN:

## 2018-03-01 NOTE — PROGRESS NOTE ADULT - SUBJECTIVE AND OBJECTIVE BOX
LENGTH OF HOSPITAL STAY: 2 d    CHIEF COMPLAINT:   Patient is a 72y old  Female who presents with a chief complaint of Chest pain (2018 21:43)    EVENTS OVER NIGHT;  s/p cath and CARLY in      HISTORY OF PRESENTING ILLNESS:    HPI:  73 y/o F with PMH COPD, DM, HTN, DLD presents with intermittent pressure like substernal CP, non radiating, with sweating,with nausea and 4 episodes of non bloody non bilious vomiting, x 2-3 days with sporadic cough and  CP is worse with coughing. She also complains of SOB. She denies abdominal pain, back pain, rash, leg pain/swelling, difficulty walking, recent travel, sick contacts, hx MI/cardiac disease. She presented to Dr. Agata foley her PMD today who sent her to ED for further eval. He recommends admission with dr. lucia as cardiologist.  Patient dose not have any chest pain currently. (2018 21:43)    PAST MEDICAL & SURGICAL HISTORY  PAST MEDICAL & SURGICAL HISTORY:  Arthritis  COPD (chronic obstructive pulmonary disease)  Asthma  Osteoporosis  DM (diabetes mellitus)  HTN (hypertension)  History of hip surgery  H/O shoulder surgery  History of cholecystectomy    SOCIAL HISTORY: ex smoker ,quit at age of 40    ALLERGIES:  No Known Allergies    Home Medications:  albuterol:  (2018 22:01)  diclofenac 3% topical gel: Apply topically to affected area 2 times a day, As Needed (2018 22:01)  gabapentin:  (2018 22:01)  Linzess 72 mcg oral capsule: 1 cap(s) orally once a day (2018 22:01)  metFORMIN:  (2018 22:01)  oxyCODONE 15 mg oral tablet: 1 tab(s) orally 3 times a day (2018 22:01)  Viberzi 75 mg oral tablet: 1 tab(s) orally 2 times a day (2018 22:01)      MEDICATIONS:  STANDING MEDICATIONS  aspirin  chewable 81 milliGRAM(s) Oral daily  atorvastatin 80 milliGRAM(s) Oral at bedtime  cefTRIAXone   IVPB      clopidogrel Tablet 75 milliGRAM(s) Oral daily  dextrose 5%. 1000 milliLiter(s) IV Continuous <Continuous>  dextrose 50% Injectable 12.5 Gram(s) IV Push once  dextrose 50% Injectable 25 Gram(s) IV Push once  dextrose 50% Injectable 25 Gram(s) IV Push once  heparin  Infusion. 1250 Unit(s)/Hr IV Continuous <Continuous>  insulin glargine Injectable (LANTUS) 15 Unit(s) SubCutaneous at bedtime  insulin lispro Injectable (HumaLOG) 5 Unit(s) SubCutaneous before breakfast  insulin lispro Injectable (HumaLOG) 5 Unit(s) SubCutaneous before lunch  insulin lispro Injectable (HumaLOG) 5 Unit(s) SubCutaneous before dinner  metoprolol     tartrate 25 milliGRAM(s) Oral two times a day    PRN MEDICATIONS  ALBUTerol    90 MICROgram(s) HFA Inhaler 2 Puff(s) Inhalation every 6 hours PRN  dextrose Gel 1 Dose(s) Oral once PRN  glucagon  Injectable 1 milliGRAM(s) IntraMuscular once PRN    VITALS:   ICU Vital Signs Last 24 Hrs  T(C): 36.2 (01 Mar 2018 04:00), Max: 36.9 (2018 08:31)  T(F): 97.2 (01 Mar 2018 04:00), Max: 98.5 (2018 08:31)  HR: 60 (01 Mar 2018 06:00) (60 - 82)  BP: 119/62 (01 Mar 2018 06:00) (97/47 - 125/73)  BP(mean): 83 (01 Mar 2018 04:00) (65 - 91)  ABP: --  ABP(mean): --  RR: 13 (01 Mar 2018 06:00) (13 - 31)  SpO2: 96% (2018 18:32) (96% - 98%)    LABS:                                            11.6   11.31 )-----------( 300      ( 01 Mar 2018 05:28 )             35.4       133<L>  |  101  |  22<H>  ----------------------------<  184<H>  4.5   |  24  |  1.2    Ca    9.0      2018 04:44  Mg     2.0         TPro  6.7  /  Alb  3.3  /  TBili  1.0  /  DBili  x   /  AST  45<H>  /  ALT  16  /  AlkPhos  102      PT/INR - ( 2018 04:44 )   PT: 11.40 sec;   INR: 1.05 ratio         PTT - ( 2018 12:17 )  PTT:37.8 sec  Urinalysis Basic - ( 2018 03:21 )    Color: Yellow / Appearance: Turbid / S.010 / pH: x  Gluc: x / Ketone: Negative  / Bili: Negative / Urobili: 0.2 mg/dL   Blood: x / Protein: Trace mg/dL / Nitrite: Positive   Leuk Esterase: Large / RBC: 5-10 /HPF / WBC 10-25 /HPF   Sq Epi: x / Non Sq Epi: Few /HPF / Bacteria: Few /HPF        Creatine Kinase, Serum: 375 U/L <H> (18 @ 04:44)  Troponin I, Serum: 7.15 ng/mL <HH> (18 @ 04:44)  Creatine Kinase, Serum: 447 U/L <H> (18 @ 01:30)  Troponin I, Serum: 9.40 ng/mL <HH> (18 @ 01:30)  Troponin I, Serum: 9.10 ng/mL <HH> (18 @ 18:43)  Lactate, Blood: 1.5 mmol/L (18 @ 18:43)      CARDIAC MARKERS ( 2018 04:44 )  7.15 ng/mL / x     / 375 U/L / x     / 23.9 ng/mL  CARDIAC MARKERS ( 2018 01:30 )  9.40 ng/mL / x     / 447 U/L / x     / 32.5 ng/mL  CARDIAC MARKERS ( 2018 18:43 )  9.10 ng/mL / x     / x     / x     / 41.4 ng/mL      RADIOLOGY:< from: Xray Chest 1 View- PORTABLE-Routine (18 @ 05:12) >  Cardiomegaly with atelectasis.    < end of copied text >    < from: 12 Lead ECG (18 @ 07:25) >  Diagnosis Line Normal sinus rhythm  Normal ECG    < end of copied text >    < from: Transthoracic Echocardiogram (18 @ 09:25) >  1. Left ventricular ejection fraction, by visual estimation, is 45 to   50%.   2. Mid and apical anterior septum, mid and apical inferior septum, and   mid anterior segment are abnormal as described above.   3. Mild aortic regurgitation    < end of copied text >      PHYSICAL EXAM:  GEN: No acute distress  HEENT:   LUNGS: Clear to auscultation bilaterally   HEART: S1/S2 present. RRR.   ABD: Soft, non-tender, non-distended. Bowel sounds present  EXT:no LE edema  NEURO: AAOX3 LENGTH OF HOSPITAL STAY: 2 d    CHIEF COMPLAINT:   Patient is a 72y old  Female who presents with a chief complaint of Chest pain (2018 21:43)    EVENTS OVER NIGHT;  s/p cath and CARLY in  LAD with kissing balloon    HISTORY OF PRESENTING ILLNESS:    HPI:  71 y/o F with PMH COPD, DM, HTN, DLD presents with intermittent pressure like substernal CP, non radiating, with sweating,with nausea and 4 episodes of non bloody non bilious vomiting, x 2-3 days with sporadic cough and  CP is worse with coughing. She also complains of SOB. She denies abdominal pain, back pain, rash, leg pain/swelling, difficulty walking, recent travel, sick contacts, hx MI/cardiac disease. She presented to Dr. Agata foley her PMD today who sent her to ED for further eval. He recommends admission with dr. lucia as cardiologist.  Patient dose not have any chest pain currently. (2018 21:43)    PAST MEDICAL & SURGICAL HISTORY  PAST MEDICAL & SURGICAL HISTORY:  Arthritis  COPD (chronic obstructive pulmonary disease)  Asthma  Osteoporosis  DM (diabetes mellitus)  HTN (hypertension)  History of hip surgery  H/O shoulder surgery  History of cholecystectomy    SOCIAL HISTORY: ex smoker ,quit at age of 40    ALLERGIES:  No Known Allergies    Home Medications:  albuterol:  (2018 22:01)  diclofenac 3% topical gel: Apply topically to affected area 2 times a day, As Needed (2018 22:01)  gabapentin:  (2018 22:01)  Linzess 72 mcg oral capsule: 1 cap(s) orally once a day (2018 22:01)  metFORMIN:  (2018 22:01)  oxyCODONE 15 mg oral tablet: 1 tab(s) orally 3 times a day (2018 22:01)  Viberzi 75 mg oral tablet: 1 tab(s) orally 2 times a day (2018 22:01)      MEDICATIONS:  STANDING MEDICATIONS  aspirin  chewable 81 milliGRAM(s) Oral daily  atorvastatin 80 milliGRAM(s) Oral at bedtime  cefTRIAXone   IVPB      clopidogrel Tablet 75 milliGRAM(s) Oral daily  dextrose 5%. 1000 milliLiter(s) IV Continuous <Continuous>  dextrose 50% Injectable 12.5 Gram(s) IV Push once  dextrose 50% Injectable 25 Gram(s) IV Push once  dextrose 50% Injectable 25 Gram(s) IV Push once  heparin  Infusion. 1250 Unit(s)/Hr IV Continuous <Continuous>  insulin glargine Injectable (LANTUS) 15 Unit(s) SubCutaneous at bedtime  insulin lispro Injectable (HumaLOG) 5 Unit(s) SubCutaneous before breakfast  insulin lispro Injectable (HumaLOG) 5 Unit(s) SubCutaneous before lunch  insulin lispro Injectable (HumaLOG) 5 Unit(s) SubCutaneous before dinner  metoprolol     tartrate 25 milliGRAM(s) Oral two times a day    PRN MEDICATIONS  ALBUTerol    90 MICROgram(s) HFA Inhaler 2 Puff(s) Inhalation every 6 hours PRN  dextrose Gel 1 Dose(s) Oral once PRN  glucagon  Injectable 1 milliGRAM(s) IntraMuscular once PRN    VITALS:   ICU Vital Signs Last 24 Hrs  T(C): 36.2 (01 Mar 2018 04:00), Max: 36.9 (2018 08:31)  T(F): 97.2 (01 Mar 2018 04:00), Max: 98.5 (2018 08:31)  HR: 60 (01 Mar 2018 06:00) (60 - 82)  BP: 119/62 (01 Mar 2018 06:00) (97/47 - 125/73)  BP(mean): 83 (01 Mar 2018 04:00) (65 - 91)  ABP: --  ABP(mean): --  RR: 13 (01 Mar 2018 06:00) (13 - 31)  SpO2: 96% (2018 18:32) (96% - 98%)    LABS:                                            11.6   11.31 )-----------( 300      ( 01 Mar 2018 05:28 )             35.4   03-    134<L>  |  100  |  25<H>  ----------------------------<  166<H>  4.2   |  25  |  1.7<H>    Ca    9.0      01 Mar 2018 05:28  Mg     2.1     03-    TPro  6.7  /  Alb  3.3  /  TBili  1.0  /  DBili  x   /  AST  45<H>  /  ALT  16  /  AlkPhos  102      PT/INR - ( 2018 04:44 )   PT: 11.40 sec;   INR: 1.05 ratio         PTT - ( 2018 12:17 )  PTT:37.8 sec  Urinalysis Basic - ( 2018 03:21 )    Color: Yellow / Appearance: Turbid / S.010 / pH: x  Gluc: x / Ketone: Negative  / Bili: Negative / Urobili: 0.2 mg/dL   Blood: x / Protein: Trace mg/dL / Nitrite: Positive   Leuk Esterase: Large / RBC: 5-10 /HPF / WBC 10-25 /HPF   Sq Epi: x / Non Sq Epi: Few /HPF / Bacteria: Few /HPF        Creatine Kinase, Serum: 375 U/L <H> (18 @ 04:44)  Troponin I, Serum: 7.15 ng/mL <HH> (18 @ 04:44)  Creatine Kinase, Serum: 447 U/L <H> (18 @ 01:30)  Troponin I, Serum: 9.40 ng/mL <HH> (18 @ 01:30)  Troponin I, Serum: 9.10 ng/mL <HH> (18 @ 18:43)  Lactate, Blood: 1.5 mmol/L (18 @ 18:43)      CARDIAC MARKERS ( 2018 04:44 )  7.15 ng/mL / x     / 375 U/L / x     / 23.9 ng/mL  CARDIAC MARKERS ( 2018 01:30 )  9.40 ng/mL / x     / 447 U/L / x     / 32.5 ng/mL  CARDIAC MARKERS ( 2018 18:43 )  9.10 ng/mL / x     / x     / x     / 41.4 ng/mL      RADIOLOGY:< from: Xray Chest 1 View- PORTABLE-Routine (18 @ 05:12) >  Cardiomegaly with atelectasis.    < end of copied text >    < from: 12 Lead ECG (18 @ 07:25) >  Diagnosis Line Normal sinus rhythm  Normal ECG    < end of copied text >    < from: Transthoracic Echocardiogram (18 @ 09:25) >  1. Left ventricular ejection fraction, by visual estimation, is 45 to   50%.   2. Mid and apical anterior septum, mid and apical inferior septum, and   mid anterior segment are abnormal as described above.   3. Mild aortic regurgitation    < end of copied text >      PHYSICAL EXAM:  GEN: No acute distress  HEENT:   LUNGS: Clear to auscultation bilaterally   HEART: S1/S2 present. RRR.   ABD: Soft, non-tender, non-distended. Bowel sounds present  EXT:no LE edema  NEURO: AAOX3 LENGTH OF HOSPITAL STAY: 2 d    CHIEF COMPLAINT:   Patient is a 72y old  Female who presents with a chief complaint of Chest pain (2018 21:43)    EVENTS OVER NIGHT;      s/p cath and CARLY in  LAD with kissing balloon  ,no chest pain  HISTORY OF PRESENTING ILLNESS:    HPI:  71 y/o F with PMH COPD, DM, HTN, DLD presents with intermittent pressure like substernal CP, non radiating, with sweating,with nausea and 4 episodes of non bloody non bilious vomiting, x 2-3 days with sporadic cough and  CP is worse with coughing. She also complains of SOB. She denies abdominal pain, back pain, rash, leg pain/swelling, difficulty walking, recent travel, sick contacts, hx MI/cardiac disease. She presented to Dr. Agata foley her PMD today who sent her to ED for further eval. He recommends admission with dr. lucia as cardiologist.  Patient dose not have any chest pain currently. (2018 21:43)    PAST MEDICAL & SURGICAL HISTORY  PAST MEDICAL & SURGICAL HISTORY:  Arthritis  COPD (chronic obstructive pulmonary disease)  Asthma  Osteoporosis  DM (diabetes mellitus)  HTN (hypertension)  History of hip surgery  H/O shoulder surgery  History of cholecystectomy    SOCIAL HISTORY: ex smoker ,quit at age of 40    ALLERGIES:  No Known Allergies    Home Medications:  albuterol:  (2018 22:01)  diclofenac 3% topical gel: Apply topically to affected area 2 times a day, As Needed (2018 22:01)  gabapentin:  (2018 22:01)  Linzess 72 mcg oral capsule: 1 cap(s) orally once a day (2018 22:01)  metFORMIN:  (2018 22:01)  oxyCODONE 15 mg oral tablet: 1 tab(s) orally 3 times a day (2018 22:01)  Viberzi 75 mg oral tablet: 1 tab(s) orally 2 times a day (2018 22:01)      MEDICATIONS:  STANDING MEDICATIONS  aspirin  chewable 81 milliGRAM(s) Oral daily  atorvastatin 80 milliGRAM(s) Oral at bedtime  cefTRIAXone   IVPB      clopidogrel Tablet 75 milliGRAM(s) Oral daily  dextrose 5%. 1000 milliLiter(s) IV Continuous <Continuous>  dextrose 50% Injectable 12.5 Gram(s) IV Push once  dextrose 50% Injectable 25 Gram(s) IV Push once  dextrose 50% Injectable 25 Gram(s) IV Push once  heparin  Infusion. 1250 Unit(s)/Hr IV Continuous <Continuous>  insulin glargine Injectable (LANTUS) 15 Unit(s) SubCutaneous at bedtime  insulin lispro Injectable (HumaLOG) 5 Unit(s) SubCutaneous before breakfast  insulin lispro Injectable (HumaLOG) 5 Unit(s) SubCutaneous before lunch  insulin lispro Injectable (HumaLOG) 5 Unit(s) SubCutaneous before dinner  metoprolol     tartrate 25 milliGRAM(s) Oral two times a day    PRN MEDICATIONS  ALBUTerol    90 MICROgram(s) HFA Inhaler 2 Puff(s) Inhalation every 6 hours PRN  dextrose Gel 1 Dose(s) Oral once PRN  glucagon  Injectable 1 milliGRAM(s) IntraMuscular once PRN    VITALS:   ICU Vital Signs Last 24 Hrs  T(C): 36.2 (01 Mar 2018 04:00), Max: 36.9 (2018 08:31)  T(F): 97.2 (01 Mar 2018 04:00), Max: 98.5 (2018 08:31)  HR: 60 (01 Mar 2018 06:00) (60 - 82)  BP: 119/62 (01 Mar 2018 06:00) (97/47 - 125/73)  BP(mean): 83 (01 Mar 2018 04:00) (65 - 91)  ABP: --  ABP(mean): --  RR: 13 (01 Mar 2018 06:00) (13 - 31)  SpO2: 96% (2018 18:32) (96% - 98%)    LABS:                                            11.6   11.31 )-----------( 300      ( 01 Mar 2018 05:28 )             35.4   03-01    134<L>  |  100  |  25<H>  ----------------------------<  166<H>  4.2   |  25  |  1.7<H>    Ca    9.0      01 Mar 2018 05:28  Mg     2.1     03-    TPro  6.7  /  Alb  3.3  /  TBili  1.0  /  DBili  x   /  AST  45<H>  /  ALT  16  /  AlkPhos  102      PT/INR - ( 2018 04:44 )   PT: 11.40 sec;   INR: 1.05 ratio         PTT - ( 2018 12:17 )  PTT:37.8 sec  Urinalysis Basic - ( 2018 03:21 )    Color: Yellow / Appearance: Turbid / S.010 / pH: x  Gluc: x / Ketone: Negative  / Bili: Negative / Urobili: 0.2 mg/dL   Blood: x / Protein: Trace mg/dL / Nitrite: Positive   Leuk Esterase: Large / RBC: 5-10 /HPF / WBC 10-25 /HPF   Sq Epi: x / Non Sq Epi: Few /HPF / Bacteria: Few /HPF        Creatine Kinase, Serum: 375 U/L <H> (18 @ 04:44)  Troponin I, Serum: 7.15 ng/mL <HH> (18 @ 04:44)  Creatine Kinase, Serum: 447 U/L <H> (18 @ 01:30)  Troponin I, Serum: 9.40 ng/mL <HH> (18 @ 01:30)  Troponin I, Serum: 9.10 ng/mL <HH> (18 @ 18:43)  Lactate, Blood: 1.5 mmol/L (18 @ 18:43)      CARDIAC MARKERS ( 2018 04:44 )  7.15 ng/mL / x     / 375 U/L / x     / 23.9 ng/mL  CARDIAC MARKERS ( 2018 01:30 )  9.40 ng/mL / x     / 447 U/L / x     / 32.5 ng/mL  CARDIAC MARKERS ( 2018 18:43 )  9.10 ng/mL / x     / x     / x     / 41.4 ng/mL      RADIOLOGY:< from: Xray Chest 1 View- PORTABLE-Routine (18 @ 05:12) >  Cardiomegaly with atelectasis.    < end of copied text >    < from: 12 Lead ECG (18 @ 07:25) >  Diagnosis Line Normal sinus rhythm  Normal ECG    < end of copied text >    < from: Transthoracic Echocardiogram (18 @ 09:25) >  1. Left ventricular ejection fraction, by visual estimation, is 45 to   50%.   2. Mid and apical anterior septum, mid and apical inferior septum, and   mid anterior segment are abnormal as described above.   3. Mild aortic regurgitation    < end of copied text >      PHYSICAL EXAM:  GEN: No acute distress  HEENT:   LUNGS: Clear to auscultation bilaterally   HEART: S1/S2 present. RRR.   ABD: Soft, non-tender, non-distended. Bowel sounds present  EXT:no LE edema  NEURO: AAOX3 LENGTH OF HOSPITAL STAY: 2 d    CHIEF COMPLAINT:   Patient is a 72y old  Female who presents with a chief complaint of Chest pain (2018 21:43)    EVENTS OVER NIGHT;      s/p cath and CARLY in  LAD with kissing balloon  ,no chest pain  HISTORY OF PRESENTING ILLNESS:    HPI:  73 y/o F with PMH COPD, DM, HTN, DLD presents with intermittent pressure like substernal CP, non radiating, with sweating,with nausea and 4 episodes of non bloody non bilious vomiting, x 2-3 days with sporadic cough and  CP is worse with coughing. She also complains of SOB. She denies abdominal pain, back pain, rash, leg pain/swelling, difficulty walking, recent travel, sick contacts, hx MI/cardiac disease. She presented to Dr. Agata foley her PMD today who sent her to ED for further eval. He recommends admission with dr. lucia as cardiologist.  Patient dose not have any chest pain currently. (2018 21:43)    PAST MEDICAL & SURGICAL HISTORY  PAST MEDICAL & SURGICAL HISTORY:  Arthritis  COPD (chronic obstructive pulmonary disease)  Asthma  Osteoporosis  DM (diabetes mellitus)  HTN (hypertension)  History of hip surgery  H/O shoulder surgery  History of cholecystectomy    SOCIAL HISTORY: ex smoker ,quit at age of 40    ALLERGIES:  No Known Allergies    Home Medications:  albuterol:  (2018 22:01)  diclofenac 3% topical gel: Apply topically to affected area 2 times a day, As Needed (2018 22:01)  gabapentin:  (2018 22:01)  Linzess 72 mcg oral capsule: 1 cap(s) orally once a day (2018 22:01)  metFORMIN:  (2018 22:01)  oxyCODONE 15 mg oral tablet: 1 tab(s) orally 3 times a day (2018 22:01)  Viberzi 75 mg oral tablet: 1 tab(s) orally 2 times a day (2018 22:01)      MEDICATIONS:  STANDING MEDICATIONS  aspirin  chewable 81 milliGRAM(s) Oral daily  atorvastatin 80 milliGRAM(s) Oral at bedtime  cefTRIAXone   IVPB      clopidogrel Tablet 75 milliGRAM(s) Oral daily  dextrose 5%. 1000 milliLiter(s) IV Continuous <Continuous>  dextrose 50% Injectable 12.5 Gram(s) IV Push once  dextrose 50% Injectable 25 Gram(s) IV Push once  dextrose 50% Injectable 25 Gram(s) IV Push once  heparin  Infusion. 1250 Unit(s)/Hr IV Continuous <Continuous>  insulin glargine Injectable (LANTUS) 15 Unit(s) SubCutaneous at bedtime  insulin lispro Injectable (HumaLOG) 5 Unit(s) SubCutaneous before breakfast  insulin lispro Injectable (HumaLOG) 5 Unit(s) SubCutaneous before lunch  insulin lispro Injectable (HumaLOG) 5 Unit(s) SubCutaneous before dinner  metoprolol     tartrate 25 milliGRAM(s) Oral two times a day    PRN MEDICATIONS  ALBUTerol    90 MICROgram(s) HFA Inhaler 2 Puff(s) Inhalation every 6 hours PRN  dextrose Gel 1 Dose(s) Oral once PRN  glucagon  Injectable 1 milliGRAM(s) IntraMuscular once PRN    VITALS:   ICU Vital Signs Last 24 Hrs  T(C): 36.2 (01 Mar 2018 04:00), Max: 36.9 (2018 08:31)  T(F): 97.2 (01 Mar 2018 04:00), Max: 98.5 (2018 08:31)  HR: 60 (01 Mar 2018 06:00) (60 - 82)  BP: 119/62 (01 Mar 2018 06:00) (97/47 - 125/73)  BP(mean): 83 (01 Mar 2018 04:00) (65 - 91)  ABP: --  ABP(mean): --  RR: 13 (01 Mar 2018 06:00) (13 - 31)  SpO2: 96% (2018 18:32) (96% - 98%)    LABS:                                            11.6   11.31 )-----------( 300      ( 01 Mar 2018 05:28 )             35.4   03-01    134<L>  |  100  |  25<H>  ----------------------------<  166<H>  4.2   |  25  |  1.7<H>    Ca    9.0      01 Mar 2018 05:28  Mg     2.1     03-    TPro  6.7  /  Alb  3.3  /  TBili  1.0  /  DBili  x   /  AST  45<H>  /  ALT  16  /  AlkPhos  102      PT/INR - ( 2018 04:44 )   PT: 11.40 sec;   INR: 1.05 ratio         PTT - ( 2018 12:17 )  PTT:37.8 sec  Urinalysis Basic - ( 2018 03:21 )    Color: Yellow / Appearance: Turbid / S.010 / pH: x  Gluc: x / Ketone: Negative  / Bili: Negative / Urobili: 0.2 mg/dL   Blood: x / Protein: Trace mg/dL / Nitrite: Positive   Leuk Esterase: Large / RBC: 5-10 /HPF / WBC 10-25 /HPF   Sq Epi: x / Non Sq Epi: Few /HPF / Bacteria: Few /HPF        Creatine Kinase, Serum: 375 U/L <H> (18 @ 04:44)  Troponin I, Serum: 7.15 ng/mL <HH> (18 @ 04:44)  Creatine Kinase, Serum: 447 U/L <H> (18 @ 01:30)  Troponin I, Serum: 9.40 ng/mL <HH> (18 @ 01:30)  Troponin I, Serum: 9.10 ng/mL <HH> (18 @ 18:43)  Lactate, Blood: 1.5 mmol/L (18 @ 18:43)    CARDIAC MARKERS ( 01 Mar 2018 05:28 )  4.37 ng/mL / x     / 225 U/L / x     / x      CARDIAC MARKERS ( 2018 04:44 )  7.15 ng/mL / x     / 375 U/L / x     / 23.9 ng/mL  CARDIAC MARKERS ( 2018 01:30 )  9.40 ng/mL / x     / 447 U/L / x     / 32.5 ng/mL  CARDIAC MARKERS ( 2018 18:43 )  9.10 ng/mL / x     / x     / x     / 41.4 ng/mL      CARDIAC MARKERS ( 2018 04:44 )  7.15 ng/mL / x     / 375 U/L / x     / 23.9 ng/mL  CARDIAC MARKERS ( 2018 01:30 )  9.40 ng/mL / x     / 447 U/L / x     / 32.5 ng/mL  CARDIAC MARKERS ( 2018 18:43 )  9.10 ng/mL / x     / x     / x     / 41.4 ng/mL          RADIOLOGY:< from: Xray Chest 1 View- PORTABLE-Routine (18 @ 05:12) >  Cardiomegaly with atelectasis.    < end of copied text >    < from: 12 Lead ECG (18 @ 07:25) >  Diagnosis Line Normal sinus rhythm  Normal ECG    < end of copied text >    < from: Transthoracic Echocardiogram (18 @ 09:25) >  1. Left ventricular ejection fraction, by visual estimation, is 45 to   50%.   2. Mid and apical anterior septum, mid and apical inferior septum, and   mid anterior segment are abnormal as described above.   3. Mild aortic regurgitation    < end of copied text >      PHYSICAL EXAM:  GEN: No acute distress  HEENT: within normal range  LUNGS: Clear to auscultation bilaterally   HEART: S1/S2 present. RRR.   ABD: Soft, non-tender, non-distended. Bowel sounds present  EXT:no LE edema,rt radial site ,clean ,no hematoma  NEURO: AAOX3

## 2018-03-01 NOTE — PROGRESS NOTE ADULT - ASSESSMENT
NSTEMI post PCI of the LAD, doing well EF55%  continue with ASA, plavix and stains.  Bblockers.    CAD need pci of the RCA in 4-6 weeks    downgrade to TELE and ambulate.    anticipate to DC in 24h    UTI start ATB

## 2018-03-01 NOTE — PROGRESS NOTE ADULT - SUBJECTIVE AND OBJECTIVE BOX
Pt seen, and chart reviewed  Spoke to dr pimentel yesterday  s/p PCI 1 vessel   2nd PCI staged procedure in 4-6 wks  Pt currently is comfortable  pain free.        SOCIAL HISTORY:  n/a    REVIEW OF SYSTEMS:    Constitutional: No fever, weight loss or fatigue  ENT:  No difficulty hearing, tinnitus, vertigo; No sinus or throat pain  Neck: No pain or stiffness  Respiratory: No cough, wheezing, chills or hemoptysis  Cardiovascular: No chest pain, palpitations, shortness of breath, dizziness or leg swelling  Gastrointestinal: No abdominal or epigastric pain. No nausea, vomiting or hematemesis; No diarrhea or constipation. No melena or hematochezia.  Neurological: No headaches, memory loss, loss of strength, numbness or tremors  Musculoskeletal: No joint pain or swelling; No muscle, back or extremity pain  Psychiatric: No depression, anxiety, mood swings or difficulty sleeping    MEDICATIONS  (STANDING):  aspirin  chewable 81 milliGRAM(s) Oral daily  atorvastatin 80 milliGRAM(s) Oral at bedtime  cefTRIAXone   IVPB      cefTRIAXone   IVPB 1 Gram(s) IV Intermittent every 24 hours  clopidogrel Tablet 75 milliGRAM(s) Oral daily  dextrose 5%. 1000 milliLiter(s) (50 mL/Hr) IV Continuous <Continuous>  dextrose 50% Injectable 12.5 Gram(s) IV Push once  dextrose 50% Injectable 25 Gram(s) IV Push once  dextrose 50% Injectable 25 Gram(s) IV Push once  insulin glargine Injectable (LANTUS) 15 Unit(s) SubCutaneous at bedtime  insulin lispro Injectable (HumaLOG) 5 Unit(s) SubCutaneous before breakfast  insulin lispro Injectable (HumaLOG) 5 Unit(s) SubCutaneous before lunch  insulin lispro Injectable (HumaLOG) 5 Unit(s) SubCutaneous before dinner  metoprolol     tartrate 25 milliGRAM(s) Oral two times a day    MEDICATIONS  (PRN):  ALBUTerol    90 MICROgram(s) HFA Inhaler 2 Puff(s) Inhalation every 6 hours PRN Shortness of Breath and/or Wheezing  dextrose Gel 1 Dose(s) Oral once PRN Blood Glucose LESS THAN 70 milliGRAM(s)/deciliter  glucagon  Injectable 1 milliGRAM(s) IntraMuscular once PRN Glucose LESS THAN 70 milligrams/deciliter      Vital Signs Last 24 Hrs  T(C): 36.2 (01 Mar 2018 04:00), Max: 36.2 (2018 20:00)  T(F): 97.2 (01 Mar 2018 04:00), Max: 97.2 (2018 20:00)  HR: 61 (01 Mar 2018 08:00) (60 - 82)  BP: 100/59 (01 Mar 2018 08:00) (100/59 - 125/73)  BP(mean): 83 (01 Mar 2018 04:00) (65 - 91)  RR: 25 (01 Mar 2018 08:00) (13 - 31)  SpO2: 96% (2018 18:32) (96% - 98%)    PHYSICAL EXAM:    Constitutional: NAD, well-groomed, well-developed  HEENT: PERRLA, EOMI, Normal Hearing, MMM  Neck: No LAD, No JVD  Back: Normal spine flexure, No CVA tenderness  Respiratory: CTAB/L  Cardiovascular: S1 and S2, RRR, no M/G/R  Gastrointestinal: BS+, soft, NT/ND  Extremities: No peripheral edema  Vascular: 2+ peripheral pulses  Neurological: A/O x 3, no focal deficits    LABS:                        11.6   11.31 )-----------( 300      ( 01 Mar 2018 05:28 )             35.4     03-01    134<L>  |  100  |  25<H>  ----------------------------<  166<H>  4.2   |  25  |  1.7<H>    Ca    9.0      01 Mar 2018 05:28  Mg     2.1     03-01    TPro  6.7  /  Alb  3.3  /  TBili  1.0  /  DBili  x   /  AST  45<H>  /  ALT  16  /  AlkPhos  102  02-28    PT/INR - ( 2018 04:44 )   PT: 11.40 sec;   INR: 1.05 ratio         PTT - ( 2018 12:17 )  PTT:37.8 sec  Urinalysis Basic - ( 2018 03:21 )    Color: Yellow / Appearance: Turbid / S.010 / pH: x  Gluc: x / Ketone: Negative  / Bili: Negative / Urobili: 0.2 mg/dL   Blood: x / Protein: Trace mg/dL / Nitrite: Positive   Leuk Esterase: Large / RBC: 5-10 /HPF / WBC 10-25 /HPF   Sq Epi: x / Non Sq Epi: Few /HPF / Bacteria: Few /HPF      Drug Screen Urine:n/a  Alcohol Level  n/a        RADIOLOGY & ADDITIONAL STUDIES:  reviewed in PACS

## 2018-03-02 ENCOUNTER — TRANSCRIPTION ENCOUNTER (OUTPATIENT)
Age: 73
End: 2018-03-02

## 2018-03-02 VITALS
RESPIRATION RATE: 20 BRPM | TEMPERATURE: 97 F | DIASTOLIC BLOOD PRESSURE: 71 MMHG | HEART RATE: 68 BPM | SYSTOLIC BLOOD PRESSURE: 129 MMHG

## 2018-03-02 LAB
-  AMIKACIN: SIGNIFICANT CHANGE UP
-  AMPICILLIN/SULBACTAM: SIGNIFICANT CHANGE UP
-  AMPICILLIN: SIGNIFICANT CHANGE UP
-  AZTREONAM: SIGNIFICANT CHANGE UP
-  CEFAZOLIN: SIGNIFICANT CHANGE UP
-  CEFEPIME: SIGNIFICANT CHANGE UP
-  CEFOXITIN: SIGNIFICANT CHANGE UP
-  CEFTAZIDIME: SIGNIFICANT CHANGE UP
-  CEFTRIAXONE: SIGNIFICANT CHANGE UP
-  CIPROFLOXACIN: SIGNIFICANT CHANGE UP
-  ERTAPENEM: SIGNIFICANT CHANGE UP
-  GENTAMICIN: SIGNIFICANT CHANGE UP
-  IMIPENEM: SIGNIFICANT CHANGE UP
-  LEVOFLOXACIN: SIGNIFICANT CHANGE UP
-  MEROPENEM: SIGNIFICANT CHANGE UP
-  NITROFURANTOIN: SIGNIFICANT CHANGE UP
-  PIPERACILLIN/TAZOBACTAM: SIGNIFICANT CHANGE UP
-  TOBRAMYCIN: SIGNIFICANT CHANGE UP
-  TRIMETHOPRIM/SULFAMETHOXAZOLE: SIGNIFICANT CHANGE UP
ANION GAP SERPL CALC-SCNC: 7 MMOL/L — SIGNIFICANT CHANGE UP (ref 7–14)
BASOPHILS # BLD AUTO: 0.02 K/UL — SIGNIFICANT CHANGE UP (ref 0–0.2)
BASOPHILS NFR BLD AUTO: 0.2 % — SIGNIFICANT CHANGE UP (ref 0–1)
BUN SERPL-MCNC: 29 MG/DL — HIGH (ref 10–20)
CALCIUM SERPL-MCNC: 8.5 MG/DL — SIGNIFICANT CHANGE UP (ref 8.5–10.1)
CHLORIDE SERPL-SCNC: 102 MMOL/L — SIGNIFICANT CHANGE UP (ref 98–110)
CO2 SERPL-SCNC: 23 MMOL/L — SIGNIFICANT CHANGE UP (ref 17–32)
CREAT SERPL-MCNC: 1.6 MG/DL — HIGH (ref 0.7–1.5)
CULTURE RESULTS: SIGNIFICANT CHANGE UP
EOSINOPHIL # BLD AUTO: 0.16 K/UL — SIGNIFICANT CHANGE UP (ref 0–0.7)
EOSINOPHIL NFR BLD AUTO: 1.8 % — SIGNIFICANT CHANGE UP (ref 0–8)
GLUCOSE SERPL-MCNC: 133 MG/DL — HIGH (ref 70–110)
HCT VFR BLD CALC: 34.1 % — LOW (ref 37–47)
HGB BLD-MCNC: 11.2 G/DL — LOW (ref 12–16)
IMM GRANULOCYTES NFR BLD AUTO: 0.3 % — SIGNIFICANT CHANGE UP (ref 0.1–0.3)
LYMPHOCYTES # BLD AUTO: 2.68 K/UL — SIGNIFICANT CHANGE UP (ref 1.2–3.4)
LYMPHOCYTES # BLD AUTO: 30.7 % — SIGNIFICANT CHANGE UP (ref 20.5–51.1)
MAGNESIUM SERPL-MCNC: 2.1 MG/DL — SIGNIFICANT CHANGE UP (ref 1.8–2.4)
MCHC RBC-ENTMCNC: 27.1 PG — SIGNIFICANT CHANGE UP (ref 27–31)
MCHC RBC-ENTMCNC: 32.8 G/DL — SIGNIFICANT CHANGE UP (ref 32–37)
MCV RBC AUTO: 82.4 FL — SIGNIFICANT CHANGE UP (ref 81–99)
METHOD TYPE: SIGNIFICANT CHANGE UP
MONOCYTES # BLD AUTO: 0.67 K/UL — HIGH (ref 0.1–0.6)
MONOCYTES NFR BLD AUTO: 7.7 % — SIGNIFICANT CHANGE UP (ref 1.7–9.3)
NEUTROPHILS # BLD AUTO: 5.18 K/UL — SIGNIFICANT CHANGE UP (ref 1.4–6.5)
NEUTROPHILS NFR BLD AUTO: 59.3 % — SIGNIFICANT CHANGE UP (ref 42.2–75.2)
ORGANISM # SPEC MICROSCOPIC CNT: SIGNIFICANT CHANGE UP
ORGANISM # SPEC MICROSCOPIC CNT: SIGNIFICANT CHANGE UP
PLATELET # BLD AUTO: 296 K/UL — SIGNIFICANT CHANGE UP (ref 130–400)
POTASSIUM SERPL-MCNC: 4.1 MMOL/L — SIGNIFICANT CHANGE UP (ref 3.5–5)
POTASSIUM SERPL-SCNC: 4.1 MMOL/L — SIGNIFICANT CHANGE UP (ref 3.5–5)
RBC # BLD: 4.14 M/UL — LOW (ref 4.2–5.4)
RBC # FLD: 13.6 % — SIGNIFICANT CHANGE UP (ref 11.5–14.5)
SODIUM SERPL-SCNC: 132 MMOL/L — LOW (ref 135–146)
SPECIMEN SOURCE: SIGNIFICANT CHANGE UP
WBC # BLD: 8.74 K/UL — SIGNIFICANT CHANGE UP (ref 4.8–10.8)
WBC # FLD AUTO: 8.74 K/UL — SIGNIFICANT CHANGE UP (ref 4.8–10.8)

## 2018-03-02 RX ORDER — GABAPENTIN 400 MG/1
0 CAPSULE ORAL
Qty: 0 | Refills: 0 | COMMUNITY

## 2018-03-02 RX ORDER — ATORVASTATIN CALCIUM 80 MG/1
1 TABLET, FILM COATED ORAL
Qty: 30 | Refills: 0 | OUTPATIENT
Start: 2018-03-02 | End: 2018-03-31

## 2018-03-02 RX ORDER — CLOPIDOGREL BISULFATE 75 MG/1
1 TABLET, FILM COATED ORAL
Qty: 30 | Refills: 0 | OUTPATIENT
Start: 2018-03-02 | End: 2018-03-31

## 2018-03-02 RX ORDER — METOPROLOL TARTRATE 50 MG
1 TABLET ORAL
Qty: 60 | Refills: 0 | OUTPATIENT
Start: 2018-03-02 | End: 2018-03-31

## 2018-03-02 RX ORDER — ASPIRIN/CALCIUM CARB/MAGNESIUM 324 MG
1 TABLET ORAL
Qty: 30 | Refills: 0 | OUTPATIENT
Start: 2018-03-02 | End: 2018-03-31

## 2018-03-02 RX ORDER — CIPROFLOXACIN LACTATE 400MG/40ML
1 VIAL (ML) INTRAVENOUS
Qty: 6 | Refills: 0 | OUTPATIENT
Start: 2018-03-02 | End: 2018-03-04

## 2018-03-02 RX ORDER — METFORMIN HYDROCHLORIDE 850 MG/1
0 TABLET ORAL
Qty: 0 | Refills: 0 | COMMUNITY

## 2018-03-02 RX ORDER — ATORVASTATIN CALCIUM 80 MG/1
1 TABLET, FILM COATED ORAL
Qty: 0 | Refills: 0 | COMMUNITY
Start: 2018-03-02

## 2018-03-02 RX ADMIN — Medication 81 MILLIGRAM(S): at 11:47

## 2018-03-02 RX ADMIN — Medication 5 UNIT(S): at 08:30

## 2018-03-02 RX ADMIN — CLOPIDOGREL BISULFATE 75 MILLIGRAM(S): 75 TABLET, FILM COATED ORAL at 11:46

## 2018-03-02 RX ADMIN — CEFTRIAXONE 100 GRAM(S): 500 INJECTION, POWDER, FOR SOLUTION INTRAMUSCULAR; INTRAVENOUS at 11:46

## 2018-03-02 RX ADMIN — Medication 5 UNIT(S): at 11:46

## 2018-03-02 NOTE — PROGRESS NOTE ADULT - SUBJECTIVE AND OBJECTIVE BOX
SUBJ:  No new complains, ambulating    MEDICATIONS  (STANDING):  aspirin  chewable 81 milliGRAM(s) Oral daily  atorvastatin 80 milliGRAM(s) Oral at bedtime  cefTRIAXone   IVPB      cefTRIAXone   IVPB 1 Gram(s) IV Intermittent every 24 hours  clopidogrel Tablet 75 milliGRAM(s) Oral daily  dextrose 5%. 1000 milliLiter(s) (50 mL/Hr) IV Continuous <Continuous>  dextrose 50% Injectable 12.5 Gram(s) IV Push once  dextrose 50% Injectable 25 Gram(s) IV Push once  dextrose 50% Injectable 25 Gram(s) IV Push once  insulin glargine Injectable (LANTUS) 15 Unit(s) SubCutaneous at bedtime  insulin lispro Injectable (HumaLOG) 5 Unit(s) SubCutaneous before breakfast  insulin lispro Injectable (HumaLOG) 5 Unit(s) SubCutaneous before lunch  insulin lispro Injectable (HumaLOG) 5 Unit(s) SubCutaneous before dinner  metoprolol     tartrate 25 milliGRAM(s) Oral two times a day    MEDICATIONS  (PRN):  ALBUTerol    90 MICROgram(s) HFA Inhaler 2 Puff(s) Inhalation every 6 hours PRN Shortness of Breath and/or Wheezing  dextrose Gel 1 Dose(s) Oral once PRN Blood Glucose LESS THAN 70 milliGRAM(s)/deciliter  glucagon  Injectable 1 milliGRAM(s) IntraMuscular once PRN Glucose LESS THAN 70 milligrams/deciliter            Vital Signs Last 24 Hrs  T(C): 36.6 (02 Mar 2018 05:37), Max: 36.6 (02 Mar 2018 05:37)  T(F): 97.9 (02 Mar 2018 05:37), Max: 97.9 (02 Mar 2018 05:37)  HR: 62 (02 Mar 2018 05:37) (61 - 69)  BP: 99/49 (02 Mar 2018 05:37) (95/47 - 129/69)  BP(mean): 97 (01 Mar 2018 17:29) (93 - 97)  RR: 20 (02 Mar 2018 05:37) (20 - 27)  SpO2: --     REVIEW OF SYSTEMS:  CONSTITUTIONAL: No fever, weight loss, or fatigue  CARDIOLOGY: PAtient denies chest pain, shortness of breath or syncopal episodes.   RESPIRATORY: denies shortness of breath, wheezeing.   NEUROLOGICAL: NO weakness, no focal deficits to report.  ENDOCRINOLOGICAL: no recent change in diabetic medications.   GI: no BRBPR, no N,V,diarrhea.    PSYCHIATRY: normal mood and affect  HEENT: no nasal discharge, no ecchymosis  SKIN: no ecchymosis, no breakdown  MUSCULOSKELETAL: Full range of motion x4.        PHYSICAL EXAM:  · CONSTITUTIONAL:	Well-developed, well nourished    BMI-  ·RESPIRATORY:   airway patent; breath sounds equal; good air movement; respirations non-labored; clear to auscultation bilaterally; no chest wall tenderness; no intercostal retractions; no rales,rhonchi or wheeze  · CARDIOVASCULAR	regular rate and rhythm  no rub  no murmur  normal PMI  · EXTREMITIES: No cyanosis, clubbing or edema  · VASCULAR: 	Equal and normal pulses (carotid, femoral, dorsalis pedis)  	  TELEMETRY:    ECG:    TTE:    LABS:                        11.2   8.74  )-----------( 296      ( 02 Mar 2018 07:32 )             34.1     03-02    132<L>  |  102  |  29<H>  ----------------------------<  133<H>  4.1   |  23  |  1.6<H>    Ca    8.5      02 Mar 2018 07:32  Mg     2.1     03-02      CARDIAC MARKERS ( 01 Mar 2018 05:28 )  4.37 ng/mL / x     / 225 U/L / x     / x          PTT - ( 28 Feb 2018 12:17 )  PTT:37.8 sec    I&O's Summary    BNP  RADIOLOGY & ADDITIONAL STUDIES:    IMPRESSION AND PLAN:    Patient denies any CP   D/C home and follow up as out patient.

## 2018-03-02 NOTE — DISCHARGE NOTE ADULT - CARE PLAN
Principal Discharge DX:	Acute coronary syndrome  Goal:	s/p intervention, medical management and follow up  Assessment and plan of treatment:	Patient admitted to the hospital and found to have NSTEMI. Patient taken for catheterization. Stent placed in LAD. ECHO completed, EF 55%, Patient doing well. Instructed by cardiology to continue with ASA, plavix, statin and metoprolol. Pt will be instructed to follow up with cardiology for PCI of the RCA in 4-6 weeks.  Secondary Diagnosis:	UTI (urinary tract infection)  Goal:	Medical management  Assessment and plan of treatment:	While inpatient, patient complained of dysuria/burning on urination. Urine culture was positive for E. Coli and patient was treated with IV ceftriaxone. Pt to be discharged on 3 days of oral cipro.

## 2018-03-02 NOTE — DISCHARGE NOTE ADULT - PATIENT PORTAL LINK FT
You can access the [facility] Patient Portal, offered by [facility], by registering with the following website: [email or web address]

## 2018-03-02 NOTE — DISCHARGE NOTE ADULT - CARE PROVIDER_API CALL
Lashawn Pineda), Internal Medicine  [address] [place] [address]  Phone: [phone]  Fax: [phone]    Belkys Jordan), Cardiology; Interventional Cardiology  [address] [place] [address]  Phone: [phone]  Fax: [phone]

## 2018-03-02 NOTE — DISCHARGE NOTE ADULT - PLAN OF CARE
s/p intervention, medical management and follow up Patient admitted to the hospital and found to have NSTEMI. Patient taken for catheterization. Stent placed in LAD. ECHO completed, EF 55%, Patient doing well. Instructed by cardiology to continue with ASA, plavix, statin and metoprolol. Pt will be instructed to follow up with cardiology for PCI of the RCA in 4-6 weeks. Medical management While inpatient, patient complained of dysuria/burning on urination. Urine culture was positive for E. Coli and patient was treated with IV ceftriaxone. Pt to be discharged on 3 days of oral cipro.

## 2018-03-02 NOTE — DISCHARGE NOTE ADULT - HOSPITAL COURSE
73 y/o F with PMH COPD, DM, HTN, DLD presents with intermittent pressure like substernal CP, non radiating, with sweating,with nausea and 4 episodes of non bloody non bilious vomiting, x 2-3 days with sporadic cough and  CP is worse with coughing. She also complains of SOB. She denies abdominal pain, back pain, rash, leg pain/swelling, difficulty walking, recent travel, sick contacts, hx MI/cardiac disease. She presented to Dr. Agata Perera her PMD today who sent her to ED for further eval. He recommends admission with Dr. Jordan as cardiologist.  Patient admitted to the hospital and found to have NSTEMI. Patient taken for catheterization. Stent placed in LAD. ECHO completed, EF 55%, Patient doing well. Instructed by cardiology to continue with ASA, plavix, statin and metoprolol. Pt will be instructed to follow up with cardiology for PCI of the RCA in 4-6 weeks.   While inpatient, patient complained of dysuria/burning on urination. Urine culture was positive for E. Coli and patient was treated with IV ceftriaxone. Pt to be discharged on 3 days of oral cipro.   Patient is to follow up with PMD in 1 week.

## 2018-03-02 NOTE — DISCHARGE NOTE ADULT - MEDICATION SUMMARY - MEDICATIONS TO TAKE
I will START or STAY ON the medications listed below when I get home from the hospital:    oxyCODONE 15 mg oral tablet  -- 1 tab(s) by mouth 3 times a day  -- Indication: For pain    aspirin 81 mg oral tablet, chewable  -- 1 tab(s) by mouth once a day  -- Indication: For CAD    gabapentin  -- Indication: For pain    metFORMIN  -- Indication: For DM (diabetes mellitus)    atorvastatin 80 mg oral tablet  -- 1 tab(s) by mouth once a day (at bedtime)  -- Indication: For DLD    clopidogrel 75 mg oral tablet  -- 1 tab(s) by mouth once a day  -- Indication: For CAD    metoprolol tartrate 25 mg oral tablet  -- 1 tab(s) by mouth 2 times a day  -- Indication: For HTN (hypertension)    albuterol  -- Indication: For COPD (chronic obstructive pulmonary disease)    diclofenac 3% topical gel  -- Apply on skin to affected area 2 times a day, As Needed  -- Indication: For TOPICAL    Viberzi 75 mg oral tablet  -- 1 tab(s) by mouth 2 times a day  -- Indication: For Diarrhea    Linzess 72 mcg oral capsule  -- 1 cap(s) by mouth once a day  -- Indication: For Diarrhea     Cipro 250 mg oral tablet  -- 1 tab(s) by mouth 2 times a day   -- Avoid prolonged or excessive exposure to direct and/or artificial sunlight while taking this medication.  Check with your doctor before becoming pregnant.  Do not take dairy products, antacids, or iron preparations within one hour of this medication.  Finish all this medication unless otherwise directed by prescriber.  Medication should be taken with plenty of water.    -- Indication: For UTI I will START or STAY ON the medications listed below when I get home from the hospital:    oxyCODONE 15 mg oral tablet  -- 1 tab(s) by mouth 3 times a day  -- Indication: For pain    aspirin 81 mg oral tablet, chewable  -- 1 tab(s) by mouth once a day  -- Indication: For CAD    gabapentin enacarbil 600 mg oral tablet, extended release  -- 1 tab(s) by mouth 3 times a day  -- Indication: For pain    metFORMIN 500 mg oral tablet, extended release  -- 1 tab(s) by mouth once a day  -- Indication: For Diabetes     atorvastatin 80 mg oral tablet  -- 1 tab(s) by mouth once a day (at bedtime)  -- Indication: For DLD    clopidogrel 75 mg oral tablet  -- 1 tab(s) by mouth once a day  -- Indication: For CAD    metoprolol tartrate 25 mg oral tablet  -- 1 tab(s) by mouth 2 times a day  -- Indication: For HTN (hypertension)    albuterol  -- Indication: For COPD (chronic obstructive pulmonary disease)    diclofenac 3% topical gel  -- Apply on skin to affected area 2 times a day, As Needed  -- Indication: For TOPICAL    Viberzi 75 mg oral tablet  -- 1 tab(s) by mouth 2 times a day  -- Indication: For Diarrhea    Linzess 72 mcg oral capsule  -- 1 cap(s) by mouth once a day  -- Indication: For Diarrhea     Cipro 250 mg oral tablet  -- 1 tab(s) by mouth 2 times a day   -- Avoid prolonged or excessive exposure to direct and/or artificial sunlight while taking this medication.  Check with your doctor before becoming pregnant.  Do not take dairy products, antacids, or iron preparations within one hour of this medication.  Finish all this medication unless otherwise directed by prescriber.  Medication should be taken with plenty of water.    -- Indication: For UTI

## 2018-03-05 DIAGNOSIS — M19.90 UNSPECIFIED OSTEOARTHRITIS, UNSPECIFIED SITE: ICD-10-CM

## 2018-03-05 DIAGNOSIS — E11.9 TYPE 2 DIABETES MELLITUS WITHOUT COMPLICATIONS: ICD-10-CM

## 2018-03-05 DIAGNOSIS — N39.0 URINARY TRACT INFECTION, SITE NOT SPECIFIED: ICD-10-CM

## 2018-03-05 DIAGNOSIS — I10 ESSENTIAL (PRIMARY) HYPERTENSION: ICD-10-CM

## 2018-03-05 DIAGNOSIS — I25.10 ATHEROSCLEROTIC HEART DISEASE OF NATIVE CORONARY ARTERY WITHOUT ANGINA PECTORIS: ICD-10-CM

## 2018-03-05 DIAGNOSIS — R07.9 CHEST PAIN, UNSPECIFIED: ICD-10-CM

## 2018-03-05 DIAGNOSIS — J44.9 CHRONIC OBSTRUCTIVE PULMONARY DISEASE, UNSPECIFIED: ICD-10-CM

## 2018-03-05 DIAGNOSIS — E78.5 HYPERLIPIDEMIA, UNSPECIFIED: ICD-10-CM

## 2018-03-05 DIAGNOSIS — I21.4 NON-ST ELEVATION (NSTEMI) MYOCARDIAL INFARCTION: ICD-10-CM

## 2018-03-05 DIAGNOSIS — M81.0 AGE-RELATED OSTEOPOROSIS WITHOUT CURRENT PATHOLOGICAL FRACTURE: ICD-10-CM

## 2018-03-07 DIAGNOSIS — B96.20 UNSPECIFIED ESCHERICHIA COLI [E. COLI] AS THE CAUSE OF DISEASES CLASSIFIED ELSEWHERE: ICD-10-CM

## 2018-03-07 DIAGNOSIS — I24.9 ACUTE ISCHEMIC HEART DISEASE, UNSPECIFIED: ICD-10-CM

## 2018-03-07 DIAGNOSIS — Z79.84 LONG TERM (CURRENT) USE OF ORAL HYPOGLYCEMIC DRUGS: ICD-10-CM

## 2018-04-02 PROBLEM — E11.9 TYPE 2 DIABETES MELLITUS WITHOUT COMPLICATIONS: Chronic | Status: ACTIVE | Noted: 2018-02-27

## 2018-04-02 PROBLEM — M81.0 AGE-RELATED OSTEOPOROSIS WITHOUT CURRENT PATHOLOGICAL FRACTURE: Chronic | Status: ACTIVE | Noted: 2018-02-27

## 2018-04-02 PROBLEM — J45.909 UNSPECIFIED ASTHMA, UNCOMPLICATED: Chronic | Status: ACTIVE | Noted: 2018-02-27

## 2018-04-02 PROBLEM — J44.9 CHRONIC OBSTRUCTIVE PULMONARY DISEASE, UNSPECIFIED: Chronic | Status: ACTIVE | Noted: 2018-02-27

## 2018-04-02 PROBLEM — M19.90 UNSPECIFIED OSTEOARTHRITIS, UNSPECIFIED SITE: Chronic | Status: ACTIVE | Noted: 2018-02-27

## 2018-04-02 PROBLEM — I10 ESSENTIAL (PRIMARY) HYPERTENSION: Chronic | Status: ACTIVE | Noted: 2018-02-27

## 2018-04-13 ENCOUNTER — INPATIENT (INPATIENT)
Facility: HOSPITAL | Age: 73
LOS: 0 days | Discharge: HOME | End: 2018-04-14
Attending: INTERNAL MEDICINE

## 2018-04-13 VITALS
TEMPERATURE: 96 F | DIASTOLIC BLOOD PRESSURE: 72 MMHG | SYSTOLIC BLOOD PRESSURE: 162 MMHG | HEART RATE: 60 BPM | RESPIRATION RATE: 18 BRPM

## 2018-04-13 DIAGNOSIS — Z98.890 OTHER SPECIFIED POSTPROCEDURAL STATES: Chronic | ICD-10-CM

## 2018-04-13 LAB
ANION GAP SERPL CALC-SCNC: 12 MMOL/L — SIGNIFICANT CHANGE UP (ref 7–14)
BUN SERPL-MCNC: 28 MG/DL — HIGH (ref 10–20)
CALCIUM SERPL-MCNC: 8.8 MG/DL — SIGNIFICANT CHANGE UP (ref 8.5–10.1)
CHLORIDE SERPL-SCNC: 99 MMOL/L — SIGNIFICANT CHANGE UP (ref 98–110)
CO2 SERPL-SCNC: 25 MMOL/L — SIGNIFICANT CHANGE UP (ref 17–32)
CREAT SERPL-MCNC: 1.3 MG/DL — SIGNIFICANT CHANGE UP (ref 0.7–1.5)
GLUCOSE SERPL-MCNC: 182 MG/DL — HIGH (ref 70–99)
HCT VFR BLD CALC: 34 % — LOW (ref 37–47)
HGB BLD-MCNC: 11.2 G/DL — LOW (ref 12–16)
MCHC RBC-ENTMCNC: 27.1 PG — SIGNIFICANT CHANGE UP (ref 27–31)
MCHC RBC-ENTMCNC: 32.9 G/DL — SIGNIFICANT CHANGE UP (ref 32–37)
MCV RBC AUTO: 82.1 FL — SIGNIFICANT CHANGE UP (ref 81–99)
NRBC # BLD: 0 /100 WBCS — SIGNIFICANT CHANGE UP (ref 0–0)
PLATELET # BLD AUTO: 253 K/UL — SIGNIFICANT CHANGE UP (ref 130–400)
POTASSIUM SERPL-MCNC: 4.7 MMOL/L — SIGNIFICANT CHANGE UP (ref 3.5–5)
POTASSIUM SERPL-SCNC: 4.7 MMOL/L — SIGNIFICANT CHANGE UP (ref 3.5–5)
RBC # BLD: 4.14 M/UL — LOW (ref 4.2–5.4)
RBC # FLD: 13.4 % — SIGNIFICANT CHANGE UP (ref 11.5–14.5)
SODIUM SERPL-SCNC: 136 MMOL/L — SIGNIFICANT CHANGE UP (ref 135–146)
WBC # BLD: 7 K/UL — SIGNIFICANT CHANGE UP (ref 4.8–10.8)
WBC # FLD AUTO: 7 K/UL — SIGNIFICANT CHANGE UP (ref 4.8–10.8)

## 2018-04-13 RX ORDER — CLOPIDOGREL BISULFATE 75 MG/1
75 TABLET, FILM COATED ORAL DAILY
Qty: 0 | Refills: 0 | Status: DISCONTINUED | OUTPATIENT
Start: 2018-04-13 | End: 2018-04-14

## 2018-04-13 RX ORDER — GABAPENTIN 400 MG/1
600 CAPSULE ORAL
Qty: 0 | Refills: 0 | Status: DISCONTINUED | OUTPATIENT
Start: 2018-04-13 | End: 2018-04-14

## 2018-04-13 RX ORDER — ALBUTEROL 90 UG/1
0 AEROSOL, METERED ORAL
Qty: 0 | Refills: 0 | COMMUNITY

## 2018-04-13 RX ORDER — SODIUM CHLORIDE 9 MG/ML
1000 INJECTION INTRAMUSCULAR; INTRAVENOUS; SUBCUTANEOUS
Qty: 0 | Refills: 0 | Status: DISCONTINUED | OUTPATIENT
Start: 2018-04-13 | End: 2018-04-13

## 2018-04-13 RX ORDER — OXYCODONE HYDROCHLORIDE 5 MG/1
1 TABLET ORAL
Qty: 0 | Refills: 0 | COMMUNITY

## 2018-04-13 RX ORDER — GABAPENTIN 400 MG/1
1 CAPSULE ORAL
Qty: 0 | Refills: 0 | COMMUNITY

## 2018-04-13 RX ORDER — HEPARIN SODIUM 5000 [USP'U]/ML
5000 INJECTION INTRAVENOUS; SUBCUTANEOUS EVERY 12 HOURS
Qty: 0 | Refills: 0 | Status: DISCONTINUED | OUTPATIENT
Start: 2018-04-13 | End: 2018-04-14

## 2018-04-13 RX ORDER — SODIUM CHLORIDE 9 MG/ML
1000 INJECTION INTRAMUSCULAR; INTRAVENOUS; SUBCUTANEOUS
Qty: 0 | Refills: 0 | Status: DISCONTINUED | OUTPATIENT
Start: 2018-04-13 | End: 2018-04-14

## 2018-04-13 RX ORDER — ASPIRIN/CALCIUM CARB/MAGNESIUM 324 MG
81 TABLET ORAL DAILY
Qty: 0 | Refills: 0 | Status: DISCONTINUED | OUTPATIENT
Start: 2018-04-13 | End: 2018-04-14

## 2018-04-13 RX ORDER — LINACLOTIDE 145 UG/1
1 CAPSULE, GELATIN COATED ORAL
Qty: 0 | Refills: 0 | COMMUNITY

## 2018-04-13 RX ORDER — ELUXADOLINE 100 MG/1
1 TABLET, FILM COATED ORAL
Qty: 0 | Refills: 0 | COMMUNITY

## 2018-04-13 RX ORDER — METOPROLOL TARTRATE 50 MG
25 TABLET ORAL
Qty: 0 | Refills: 0 | Status: DISCONTINUED | OUTPATIENT
Start: 2018-04-13 | End: 2018-04-14

## 2018-04-13 RX ORDER — ATORVASTATIN CALCIUM 80 MG/1
80 TABLET, FILM COATED ORAL AT BEDTIME
Qty: 0 | Refills: 0 | Status: DISCONTINUED | OUTPATIENT
Start: 2018-04-13 | End: 2018-04-14

## 2018-04-13 RX ORDER — DICLOFENAC SODIUM 30 MG/G
1 GEL TOPICAL
Qty: 0 | Refills: 0 | COMMUNITY

## 2018-04-13 RX ORDER — GABAPENTIN 400 MG/1
600 CAPSULE ORAL THREE TIMES A DAY
Qty: 0 | Refills: 0 | Status: DISCONTINUED | OUTPATIENT
Start: 2018-04-13 | End: 2018-04-13

## 2018-04-13 RX ORDER — TIOTROPIUM BROMIDE 18 UG/1
1 CAPSULE ORAL; RESPIRATORY (INHALATION) DAILY
Qty: 0 | Refills: 0 | Status: DISCONTINUED | OUTPATIENT
Start: 2018-04-13 | End: 2018-04-14

## 2018-04-13 RX ADMIN — ATORVASTATIN CALCIUM 80 MILLIGRAM(S): 80 TABLET, FILM COATED ORAL at 21:09

## 2018-04-13 RX ADMIN — HEPARIN SODIUM 5000 UNIT(S): 5000 INJECTION INTRAVENOUS; SUBCUTANEOUS at 18:30

## 2018-04-13 RX ADMIN — GABAPENTIN 600 MILLIGRAM(S): 400 CAPSULE ORAL at 19:03

## 2018-04-13 RX ADMIN — SODIUM CHLORIDE 100 MILLILITER(S): 9 INJECTION INTRAMUSCULAR; INTRAVENOUS; SUBCUTANEOUS at 09:19

## 2018-04-13 RX ADMIN — Medication 25 MILLIGRAM(S): at 18:27

## 2018-04-13 RX ADMIN — SODIUM CHLORIDE 100 MILLILITER(S): 9 INJECTION INTRAMUSCULAR; INTRAVENOUS; SUBCUTANEOUS at 08:58

## 2018-04-13 NOTE — H&P PST ADULT - HISTORY OF PRESENT ILLNESS
72y Female here for elective cardiac catheterization. The patient has no active complaints.   Patient has had 1 prior cardiac catheterization 04-13-18 @ 07:20    relevant PMHx:  DM  HTN  CAD s/p PCI  COPD    Social history:  [  ] smoker  [x  ] non-smoker  [  ] Etoh  [  ] recreational drug usage    Physical Exam:    General: NAD  Neurology: A&Ox3, nonfocal, CORMIER x 4  Eyes: PERRLA/ EOMI.  ENT/Neck: No JVD.  Respiratory: CTA B/L, No wheezing, rales, rhonchi  CV: S1S2, no added sounds   Abdominal: Soft, NT, ND +BS.  Extremities: No edema, + peripheral pulses B/L regular pulse. Modified Ector's test of right radial artery was wnl  Skin: No Rashes, Hematoma, Ecchymosis    Labs: Reviewed,  no significant abnormalities noted

## 2018-04-13 NOTE — PROGRESS NOTE ADULT - SUBJECTIVE AND OBJECTIVE BOX
Cardiology Follow up    REBECCA SPENCER   72yFemale  PAST MEDICAL & SURGICAL HISTORY:  Arthritis  COPD (chronic obstructive pulmonary disease)  Asthma  Osteoporosis  DM (diabetes mellitus)  HTN (hypertension)  History of hip surgery  H/O shoulder surgery  History of cholecystectomy    Allergies    No Known Allergies    Intolerances        Patient without complaints.   Denies CP, SOB, palpitations, or dizziness      REVIEW OF SYSTEMS:    CONSTITUTIONAL: No weakness, fevers or chills  EYES/ENT: No visual changes;  No vertigo or throat pain   NECK: No pain or stiffness  RESPIRATORY: No cough, wheezing, hemoptysis; No shortness of breath  CARDIOVASCULAR: No chest pain or palpitations  GASTROINTESTINAL: No abdominal or epigastric pain. No nausea, vomiting, or hematemesis; No diarrhea or constipation. No melena or hematochezia.  GENITOURINARY: No dysuria, frequency or hematuria  NEUROLOGICAL: No numbness or weakness  SKIN: No itching, rashes        NAD, appears well  S1S2, no murmurs, no JVD  CTA B/L, no wheeze, no rales  SNT +BS  Ext:    Right Radial : d stat in place, NO hematoma or bleeding, + pulses mvmt, sensation , warmth , + refill     Pulses:  +Rad/ +PTs /+DPs/ same as baseline  A&Ox 3    EKG  P                                                                                                             2D ECHO    LABS                        11.2   7.00  )-----------( 253      ( 13 Apr 2018 08:38 )             34.0     04-13    136  |  99  |  28<H>  ----------------------------<  182<H>  4.7   |  25  |  1.3    Ca    8.8      13 Apr 2018 08:38              A/P:  I discussed the case with Interventional Cardiologist Dr. Jordan  & recommends the following:    S/P staged  PCI of RCA with DESx1   	         Continue DAPT,(asa81, plavix 75)  B-Blocker, Statin Therapy with previous meds                    hold metformin x 48 hrs post pci                    30 day supply of DAPT to be given for home use by MISAEL                   NS x 10hrs, check cbc, bmp and ekg in am                   f/u am BUN/CR                   Pt given instructions on importance of taking antiplatelet medication or risk acute stent thrombosis/death                   Post cath instructions, access site care and activity restrictions reviewed with patient                     Discussed with patient to return to hospital if experience chest pain, shortness breath, dizziness and site bleeding                   Aggressive risk factor modification, diet counseling, smoking cessation discussed with patient                       Follow up with Cardiology Dr. Jordan  in one week after discharge.  Instructed to call and make an appointment

## 2018-04-13 NOTE — PROGRESS NOTE ADULT - SUBJECTIVE AND OBJECTIVE BOX
PREOPERATIVE DAY OF PROCEDURE EVALUATION:  I have personally seen and examined the patient.  I agree with the history and physical which I have reviewed and noted any changes below.  (Signed electronically by Belkys Jordan MD)  04-13-18 @ 07:27                                              POST OPERATIVE PROCEDURAL DOCUMENTATION  PRE-OP DIAGNOSIS:    POST-OP DIAGNOSIS:    PROCEDURE:   LEFT HEART CATHERIZATION    Physician:  Belkys Jordan MD  Assistant:  MD    ANESTHESIA TYPE:  [ ] Sedation  [ ] Local/Regional  [  ]General Anesthesia    ESTIMATED BLOOD LOSS:      less than 10 mL    CONDITION  [ ] Good  [   ] Fair  [   ] Serious  [   ] Critical      SPECIMENS REMOVED (IF APPLICABLE):   None      IMPLANTS (IF APPLICABLE)      FINDINGS:    LEFT HEART CATHERIZATION                                    LVEF%:  LVEDP:    Left main    LAD:                        Diag:     Left Circumflex:    OM:      Right Cornary Artery:   RPDA:    RPL:       RIGHT HEART CATHERIZATION  PA:  PCW:  CO/CI:    PERCUTANEOUS CORONARY INTERVENTIONS:      COMPLICATIONS:  None      POST-OP DIAGNOSIS    [ ] Normal Coronary Arteries  [ ] Luminal Irregularities  [ ] Non-obstructive CAD        PLAN OF CARE      [ ] D/C Home today   [ ]  D/C in AM  [ ] Return to In-patient bed  [ ] Admit for observation  [ ] Return for staged procedure:  [ ] CT Surgery consult called  [ ]  Continue DAPT, B-blocker & Statin therapy  [ ]  Medical Therapy  [ ] Aggressive risk factor modification. The patient should follow a low fat and low calorie diet. PREOPERATIVE DAY OF PROCEDURE EVALUATION:  I have personally seen and examined the patient.  I agree with the history and physical which I have reviewed and noted any changes below.  (Signed electronically by Belkys Jordan MD)  04-13-18 @ 07:27                                              POST OPERATIVE PROCEDURAL DOCUMENTATION  PRE-OP DIAGNOSIS: Staged PCI    POST-OP DIAGNOSIS:    PROCEDURE:   LEFT HEART CATHERIZATION    Physician:  Belkys Jordan MD  Assistant:  MD    ANESTHESIA TYPE:  [ ] Sedation  [X ] Local/Regional  [  ]General Anesthesia    ESTIMATED BLOOD LOSS:      less than 10 mL    CONDITION  [ ] Good  [  X ] Fair  [   ] Serious  [   ] Critical      SPECIMENS REMOVED (IF APPLICABLE):   None      IMPLANTS (IF APPLICABLE)      FINDINGS:    LEFT HEART CATHERIZATION                                    LVEF%:  LVEDP:    Left main    LAD:   Patent stent                     Diag:     Left Circumflex:  normal  OM:      Right Cornary Artery: Mid 80% long lesion  RPDA:    RPL:           PERCUTANEOUS CORONARY INTERVENTIONS: PCN of the RCA with CARLY      COMPLICATIONS:  None      POST-OP DIAGNOSIS    [ ] Normal Coronary Arteries  [ ] Luminal Irregularities  [ ] Non-obstructive CAD        PLAN OF CARE      [ ] D/C Home today   [X ]  D/C in AM  [ ] Return to In-patient bed  [ ] Admit for observation  [ ] Return for staged procedure:  [ ] CT Surgery consult called  [ X]  Continue DAPT, B-blocker & Statin therapy  [ ]  Medical Therapy  [ X] Aggressive risk factor modification. The patient should follow a low fat and low calorie diet.

## 2018-04-14 ENCOUNTER — TRANSCRIPTION ENCOUNTER (OUTPATIENT)
Age: 73
End: 2018-04-14

## 2018-04-14 VITALS
DIASTOLIC BLOOD PRESSURE: 58 MMHG | RESPIRATION RATE: 18 BRPM | OXYGEN SATURATION: 96 % | HEART RATE: 58 BPM | SYSTOLIC BLOOD PRESSURE: 123 MMHG | TEMPERATURE: 96 F

## 2018-04-14 LAB
ANION GAP SERPL CALC-SCNC: 15 MMOL/L — HIGH (ref 7–14)
BUN SERPL-MCNC: 31 MG/DL — HIGH (ref 10–20)
CALCIUM SERPL-MCNC: 8.9 MG/DL — SIGNIFICANT CHANGE UP (ref 8.5–10.1)
CHLORIDE SERPL-SCNC: 101 MMOL/L — SIGNIFICANT CHANGE UP (ref 98–110)
CO2 SERPL-SCNC: 24 MMOL/L — SIGNIFICANT CHANGE UP (ref 17–32)
CREAT SERPL-MCNC: 1.3 MG/DL — SIGNIFICANT CHANGE UP (ref 0.7–1.5)
GLUCOSE SERPL-MCNC: 169 MG/DL — HIGH (ref 70–99)
HCT VFR BLD CALC: 34.1 % — LOW (ref 37–47)
HGB BLD-MCNC: 11.3 G/DL — LOW (ref 12–16)
MCHC RBC-ENTMCNC: 27.2 PG — SIGNIFICANT CHANGE UP (ref 27–31)
MCHC RBC-ENTMCNC: 33.1 G/DL — SIGNIFICANT CHANGE UP (ref 32–37)
MCV RBC AUTO: 82 FL — SIGNIFICANT CHANGE UP (ref 81–99)
NRBC # BLD: 0 /100 WBCS — SIGNIFICANT CHANGE UP (ref 0–0)
PLATELET # BLD AUTO: 257 K/UL — SIGNIFICANT CHANGE UP (ref 130–400)
POTASSIUM SERPL-MCNC: 4.6 MMOL/L — SIGNIFICANT CHANGE UP (ref 3.5–5)
POTASSIUM SERPL-SCNC: 4.6 MMOL/L — SIGNIFICANT CHANGE UP (ref 3.5–5)
RBC # BLD: 4.16 M/UL — LOW (ref 4.2–5.4)
RBC # FLD: 13.4 % — SIGNIFICANT CHANGE UP (ref 11.5–14.5)
SODIUM SERPL-SCNC: 140 MMOL/L — SIGNIFICANT CHANGE UP (ref 135–146)
WBC # BLD: 6.96 K/UL — SIGNIFICANT CHANGE UP (ref 4.8–10.8)
WBC # FLD AUTO: 6.96 K/UL — SIGNIFICANT CHANGE UP (ref 4.8–10.8)

## 2018-04-14 RX ORDER — METFORMIN HYDROCHLORIDE 850 MG/1
1 TABLET ORAL
Qty: 0 | Refills: 0 | COMMUNITY

## 2018-04-14 RX ADMIN — GABAPENTIN 600 MILLIGRAM(S): 400 CAPSULE ORAL at 05:52

## 2018-04-14 RX ADMIN — HEPARIN SODIUM 5000 UNIT(S): 5000 INJECTION INTRAVENOUS; SUBCUTANEOUS at 05:55

## 2018-04-14 RX ADMIN — Medication 81 MILLIGRAM(S): at 11:04

## 2018-04-14 RX ADMIN — TIOTROPIUM BROMIDE 1 CAPSULE(S): 18 CAPSULE ORAL; RESPIRATORY (INHALATION) at 08:36

## 2018-04-14 RX ADMIN — CLOPIDOGREL BISULFATE 75 MILLIGRAM(S): 75 TABLET, FILM COATED ORAL at 11:04

## 2018-04-14 NOTE — DISCHARGE NOTE ADULT - MEDICATION SUMMARY - MEDICATIONS TO TAKE
I will START or STAY ON the medications listed below when I get home from the hospital:    aspirin 81 mg oral tablet, chewable  -- 1 tab(s) by mouth once a day  -- Indication: For CAD S/P percutaneous coronary angioplasty    gabapentin enacarbil 600 mg oral tablet, extended release  -- 1 tab(s) by mouth 3 times a day  -- Indication: For Neuropathy    metFORMIN 500 mg oral tablet, extended release  -- 1 tab(s) by mouth once a day    restart in 24hours  -- Indication: For DM (diabetes mellitus)    atorvastatin 80 mg oral tablet  -- 1 tab(s) by mouth once a day (at bedtime)  -- Indication: For CAD S/P percutaneous coronary angioplasty    clopidogrel 75 mg oral tablet  -- 1 tab(s) by mouth once a day  -- Indication: For CAD S/P percutaneous coronary angioplasty    metoprolol tartrate 25 mg oral tablet  -- 1 tab(s) by mouth 2 times a day  -- Indication: For CAD S/P percutaneous coronary angioplasty    Linzess 72 mcg oral capsule  -- 1 cap(s) by mouth once a day  -- Indication: For GI    Viberzi 75 mg oral tablet  -- 1 tab(s) by mouth 2 times a day  -- Indication: For GI

## 2018-04-14 NOTE — DISCHARGE NOTE ADULT - CARE PROVIDER_API CALL
Belkys Jordan), Cardiology; Interventional Cardiology  [address] [place] [address]  Phone: [phone]  Fax: [phone]    Lashawn Pineda), Internal Medicine  [address] [place] [address]  Phone: [phone]  Fax: [phone]

## 2018-04-14 NOTE — DISCHARGE NOTE ADULT - CARE PLAN
Principal Discharge DX:	CAD S/P percutaneous coronary angioplasty  Goal:	Medical Management  Assessment and plan of treatment:	S/P elective stent of RCA with DESx1. Continue DAPT,(asa81, plavix 75)  B-Blocker, Statin Therapy with previous meds. 30 day supply of DAPT to be given for home use by RN. Will need to follow up with cardiology in 1 week.  Secondary Diagnosis:	DM (diabetes mellitus)  Assessment and plan of treatment:	Hold metformin x 48 hrs post pci. Follow up with PMD for routine blood work in 1 week Principal Discharge DX:	CAD S/P percutaneous coronary angioplasty  Goal:	Medical Management  Assessment and plan of treatment:	S/P elective stent of RCA with DESx1. Continue DAPT,(asa81, plavix 75)  B-Blocker, Statin Therapy with previous meds. 30 day supply of DAPT to be given for home use by RN. Will need to follow up with cardiology in 1 week.  Secondary Diagnosis:	DM (diabetes mellitus)  Goal:	Medical Management  Assessment and plan of treatment:	Hold metformin x 48 hrs post pci. Follow up with PMD for routine blood work in 1 week

## 2018-04-14 NOTE — DISCHARGE NOTE ADULT - PATIENT PORTAL LINK FT
You can access the [facility] Patient Portal, offered by [facility], by registering with the following website: [email or web address]

## 2018-04-14 NOTE — DISCHARGE NOTE ADULT - PLAN OF CARE
Medical Management S/P elective stent of RCA with DESx1. Continue DAPT,(asa81, plavix 75)  B-Blocker, Statin Therapy with previous meds. 30 day supply of DAPT to be given for home use by RN. Will need to follow up with cardiology in 1 week. Hold metformin x 48 hrs post pci. Follow up with PMD for routine blood work in 1 week

## 2018-04-16 DIAGNOSIS — E11.9 TYPE 2 DIABETES MELLITUS WITHOUT COMPLICATIONS: ICD-10-CM

## 2018-04-16 DIAGNOSIS — M19.90 UNSPECIFIED OSTEOARTHRITIS, UNSPECIFIED SITE: ICD-10-CM

## 2018-04-16 DIAGNOSIS — Z98.890 OTHER SPECIFIED POSTPROCEDURAL STATES: ICD-10-CM

## 2018-04-16 DIAGNOSIS — Z90.49 ACQUIRED ABSENCE OF OTHER SPECIFIED PARTS OF DIGESTIVE TRACT: ICD-10-CM

## 2018-04-16 DIAGNOSIS — I25.10 ATHEROSCLEROTIC HEART DISEASE OF NATIVE CORONARY ARTERY WITHOUT ANGINA PECTORIS: ICD-10-CM

## 2018-04-16 DIAGNOSIS — I10 ESSENTIAL (PRIMARY) HYPERTENSION: ICD-10-CM

## 2018-04-16 DIAGNOSIS — J44.9 CHRONIC OBSTRUCTIVE PULMONARY DISEASE, UNSPECIFIED: ICD-10-CM

## 2018-04-18 DIAGNOSIS — E78.5 HYPERLIPIDEMIA, UNSPECIFIED: ICD-10-CM

## 2018-04-18 DIAGNOSIS — Z79.84 LONG TERM (CURRENT) USE OF ORAL HYPOGLYCEMIC DRUGS: ICD-10-CM

## 2018-06-01 ENCOUNTER — APPOINTMENT (OUTPATIENT)
Dept: PODIATRY | Facility: CLINIC | Age: 73
End: 2018-06-01

## 2019-01-04 ENCOUNTER — EMERGENCY (EMERGENCY)
Facility: HOSPITAL | Age: 74
LOS: 0 days | Discharge: HOME | End: 2019-01-04
Admitting: PHYSICIAN ASSISTANT

## 2019-01-04 VITALS
TEMPERATURE: 97 F | RESPIRATION RATE: 18 BRPM | OXYGEN SATURATION: 97 % | SYSTOLIC BLOOD PRESSURE: 148 MMHG | DIASTOLIC BLOOD PRESSURE: 67 MMHG | HEART RATE: 58 BPM

## 2019-01-04 DIAGNOSIS — H65.93 UNSPECIFIED NONSUPPURATIVE OTITIS MEDIA, BILATERAL: ICD-10-CM

## 2019-01-04 DIAGNOSIS — Z98.890 OTHER SPECIFIED POSTPROCEDURAL STATES: Chronic | ICD-10-CM

## 2019-01-04 DIAGNOSIS — H91.93 UNSPECIFIED HEARING LOSS, BILATERAL: ICD-10-CM

## 2019-01-04 DIAGNOSIS — H61.21 IMPACTED CERUMEN, RIGHT EAR: ICD-10-CM

## 2019-01-04 DIAGNOSIS — H92.09 OTALGIA, UNSPECIFIED EAR: ICD-10-CM

## 2019-01-04 DIAGNOSIS — Z79.891 LONG TERM (CURRENT) USE OF OPIATE ANALGESIC: ICD-10-CM

## 2019-01-04 DIAGNOSIS — I10 ESSENTIAL (PRIMARY) HYPERTENSION: ICD-10-CM

## 2019-01-04 DIAGNOSIS — E11.9 TYPE 2 DIABETES MELLITUS WITHOUT COMPLICATIONS: ICD-10-CM

## 2019-01-04 DIAGNOSIS — J45.909 UNSPECIFIED ASTHMA, UNCOMPLICATED: ICD-10-CM

## 2019-01-04 DIAGNOSIS — J44.9 CHRONIC OBSTRUCTIVE PULMONARY DISEASE, UNSPECIFIED: ICD-10-CM

## 2019-01-04 DIAGNOSIS — Z79.899 OTHER LONG TERM (CURRENT) DRUG THERAPY: ICD-10-CM

## 2019-01-04 DIAGNOSIS — Z79.82 LONG TERM (CURRENT) USE OF ASPIRIN: ICD-10-CM

## 2019-01-04 DIAGNOSIS — Z79.84 LONG TERM (CURRENT) USE OF ORAL HYPOGLYCEMIC DRUGS: ICD-10-CM

## 2019-01-04 RX ORDER — CARBAMIDE PEROXIDE 81.86 MG/ML
5 SOLUTION/ DROPS AURICULAR (OTIC)
Qty: 10 | Refills: 0 | OUTPATIENT
Start: 2019-01-04 | End: 2019-01-10

## 2019-01-04 RX ORDER — ACETAMINOPHEN AND CHLORPHENIRAMINE MALEATE 325; 2 MG/1; MG/1
1 TABLET ORAL
Qty: 20 | Refills: 0 | OUTPATIENT
Start: 2019-01-04 | End: 2019-01-08

## 2019-01-04 NOTE — ED PROVIDER NOTE - PHYSICAL EXAMINATION
CONST: Well appearing in NAD  EYES: PERRL, EOMI, Sclera and conjunctiva clear.   ENT: Right ear with cerumen occluding TM. No mastoid tenderness. Left canal is clear and TM is dull and no LR and there is loss of landmarks. No mastoid tenderness  NECK: Non-tender, no meningeal signs  CARD: Normal S1 S2; Normal rate and rhythm  RESP: Equal BS B/L, No wheezes, rhonchi or rales. No distress  MS: Normal ROM in all extremities  SKIN: Warm, dry, no acute rashes. Good turgor  NEURO: A&Ox3, No focal deficits. Strength 5/5 with no sensory deficits. Steady gait

## 2019-01-04 NOTE — ED PROVIDER NOTE - NSFOLLOWUPCLINICS_GEN_ALL_ED_FT
[facility]  ENT  [address], [address]  [place] [address]  Phone: [phone]  Fax:   Follow Up Time: 1-3 Days

## 2019-01-04 NOTE — ED PROVIDER NOTE - OBJECTIVE STATEMENT
bilateral ear clogged x 2 weeks. Seen by PMD and told had cerumen impaction and was told to use peroxide. No relief. C/o hearing loss. No pain or fever. Pt has hx of HTN

## 2019-01-04 NOTE — ED PROVIDER NOTE - CARE PLAN
Principal Discharge DX:	Hearing loss  Secondary Diagnosis:	Serous otitis media  Secondary Diagnosis:	Cerumen impaction

## 2019-01-04 NOTE — ED ADULT NURSE NOTE - NSIMPLEMENTINTERV_GEN_ALL_ED
Implemented All Universal Safety Interventions:  [place] to call system. Call bell, personal items and telephone within reach. Instruct patient to call for assistance. Room bathroom lighting operational. Non-slip footwear when patient is off stretcher. Physically safe environment: no spills, clutter or unnecessary equipment. Stretcher in lowest position, wheels locked, appropriate side rails in place.

## 2019-01-14 ENCOUNTER — APPOINTMENT (OUTPATIENT)
Dept: OTOLARYNGOLOGY | Facility: CLINIC | Age: 74
End: 2019-01-14
Payer: MEDICARE

## 2019-01-14 VITALS
BODY MASS INDEX: 33.13 KG/M2 | HEIGHT: 62 IN | WEIGHT: 180 LBS | DIASTOLIC BLOOD PRESSURE: 78 MMHG | SYSTOLIC BLOOD PRESSURE: 122 MMHG

## 2019-01-14 DIAGNOSIS — H61.23 IMPACTED CERUMEN, BILATERAL: ICD-10-CM

## 2019-01-14 PROCEDURE — 92550 TYMPANOMETRY & REFLEX THRESH: CPT

## 2019-01-14 PROCEDURE — G0268 REMOVAL OF IMPACTED WAX MD: CPT

## 2019-01-14 PROCEDURE — 99213 OFFICE O/P EST LOW 20 MIN: CPT | Mod: 25

## 2019-01-14 PROCEDURE — 92557 COMPREHENSIVE HEARING TEST: CPT

## 2019-01-14 RX ORDER — METHYLPREDNISOLONE 4 MG/1
4 TABLET ORAL
Qty: 5 | Refills: 0 | Status: ACTIVE | COMMUNITY
Start: 2019-01-14 | End: 1900-01-01

## 2019-01-14 RX ORDER — FLUTICASONE PROPIONATE 50 UG/1
50 SPRAY, METERED NASAL
Qty: 1 | Refills: 2 | Status: ACTIVE | COMMUNITY
Start: 2019-01-14 | End: 1900-01-01

## 2019-01-14 NOTE — HISTORY OF PRESENT ILLNESS
[de-identified] : Patient here today c/o clogged ears. She has been having an earache that goes from one ear to the other, for 1 month now.\par ALso clogged ears.\par She used peroxide with no improvement.\par No dizziness, no discharge. No recent hearing test.

## 2019-01-14 NOTE — PHYSICAL EXAM
[Midline] : trachea located in midline position [Normal] : no rashes [de-identified] : bilateral impacted wax cleaned with curette

## 2019-02-04 ENCOUNTER — APPOINTMENT (OUTPATIENT)
Dept: OTOLARYNGOLOGY | Facility: CLINIC | Age: 74
End: 2019-02-04
Payer: MEDICARE

## 2019-02-04 VITALS
BODY MASS INDEX: 33.31 KG/M2 | HEIGHT: 62 IN | DIASTOLIC BLOOD PRESSURE: 87 MMHG | WEIGHT: 181 LBS | SYSTOLIC BLOOD PRESSURE: 128 MMHG

## 2019-02-04 DIAGNOSIS — H91.93 UNSPECIFIED HEARING LOSS, BILATERAL: ICD-10-CM

## 2019-02-04 DIAGNOSIS — H65.92 UNSPECIFIED NONSUPPURATIVE OTITIS MEDIA, LEFT EAR: ICD-10-CM

## 2019-02-04 PROCEDURE — 99213 OFFICE O/P EST LOW 20 MIN: CPT

## 2019-02-04 NOTE — HISTORY OF PRESENT ILLNESS
[de-identified] : Patient returns to the office as a follow up on b/l OME. Pt was started on steroids and flonase at last visit. She was put on flonase and medrol. he states that she is doing much better, denies further hearing loss.

## 2019-02-04 NOTE — ASSESSMENT
[FreeTextEntry1] : normal tympanograms.\par \par continue flonase.\par \par RTC in 1M for re-tympanogram.

## 2019-04-09 ENCOUNTER — APPOINTMENT (OUTPATIENT)
Dept: OTOLARYNGOLOGY | Facility: CLINIC | Age: 74
End: 2019-04-09

## 2019-07-17 ENCOUNTER — APPOINTMENT (OUTPATIENT)
Dept: OTOLARYNGOLOGY | Facility: CLINIC | Age: 74
End: 2019-07-17

## 2020-01-30 ENCOUNTER — OUTPATIENT (OUTPATIENT)
Dept: OUTPATIENT SERVICES | Facility: HOSPITAL | Age: 75
LOS: 1 days | Discharge: HOME | End: 2020-01-30
Payer: MEDICARE

## 2020-01-30 DIAGNOSIS — M54.5 LOW BACK PAIN: ICD-10-CM

## 2020-01-30 DIAGNOSIS — Z98.890 OTHER SPECIFIED POSTPROCEDURAL STATES: Chronic | ICD-10-CM

## 2020-01-30 PROCEDURE — 72100 X-RAY EXAM L-S SPINE 2/3 VWS: CPT | Mod: 26

## 2020-01-30 PROCEDURE — 73502 X-RAY EXAM HIP UNI 2-3 VIEWS: CPT | Mod: 26,LT

## 2020-06-22 ENCOUNTER — APPOINTMENT (OUTPATIENT)
Dept: OTOLARYNGOLOGY | Facility: CLINIC | Age: 75
End: 2020-06-22

## 2021-04-02 ENCOUNTER — EMERGENCY (EMERGENCY)
Facility: HOSPITAL | Age: 76
LOS: 0 days | Discharge: HOME | End: 2021-04-02
Attending: STUDENT IN AN ORGANIZED HEALTH CARE EDUCATION/TRAINING PROGRAM | Admitting: STUDENT IN AN ORGANIZED HEALTH CARE EDUCATION/TRAINING PROGRAM
Payer: MEDICARE

## 2021-04-02 VITALS
HEIGHT: 62 IN | DIASTOLIC BLOOD PRESSURE: 57 MMHG | HEART RATE: 83 BPM | SYSTOLIC BLOOD PRESSURE: 119 MMHG | TEMPERATURE: 97 F | OXYGEN SATURATION: 98 % | RESPIRATION RATE: 18 BRPM

## 2021-04-02 DIAGNOSIS — Z98.890 OTHER SPECIFIED POSTPROCEDURAL STATES: ICD-10-CM

## 2021-04-02 DIAGNOSIS — I25.10 ATHEROSCLEROTIC HEART DISEASE OF NATIVE CORONARY ARTERY WITHOUT ANGINA PECTORIS: ICD-10-CM

## 2021-04-02 DIAGNOSIS — Z98.890 OTHER SPECIFIED POSTPROCEDURAL STATES: Chronic | ICD-10-CM

## 2021-04-02 DIAGNOSIS — E11.9 TYPE 2 DIABETES MELLITUS WITHOUT COMPLICATIONS: ICD-10-CM

## 2021-04-02 DIAGNOSIS — Z79.84 LONG TERM (CURRENT) USE OF ORAL HYPOGLYCEMIC DRUGS: ICD-10-CM

## 2021-04-02 DIAGNOSIS — I10 ESSENTIAL (PRIMARY) HYPERTENSION: ICD-10-CM

## 2021-04-02 DIAGNOSIS — M54.2 CERVICALGIA: ICD-10-CM

## 2021-04-02 DIAGNOSIS — J44.9 CHRONIC OBSTRUCTIVE PULMONARY DISEASE, UNSPECIFIED: ICD-10-CM

## 2021-04-02 DIAGNOSIS — Z79.899 OTHER LONG TERM (CURRENT) DRUG THERAPY: ICD-10-CM

## 2021-04-02 DIAGNOSIS — Z90.49 ACQUIRED ABSENCE OF OTHER SPECIFIED PARTS OF DIGESTIVE TRACT: ICD-10-CM

## 2021-04-02 DIAGNOSIS — Z79.82 LONG TERM (CURRENT) USE OF ASPIRIN: ICD-10-CM

## 2021-04-02 PROCEDURE — 99283 EMERGENCY DEPT VISIT LOW MDM: CPT

## 2021-04-02 RX ORDER — METHOCARBAMOL 500 MG/1
2 TABLET, FILM COATED ORAL
Qty: 24 | Refills: 0
Start: 2021-04-02 | End: 2021-04-04

## 2021-04-02 RX ORDER — IBUPROFEN 200 MG
400 TABLET ORAL ONCE
Refills: 0 | Status: COMPLETED | OUTPATIENT
Start: 2021-04-02 | End: 2021-04-02

## 2021-04-02 RX ORDER — METHOCARBAMOL 500 MG/1
1000 TABLET, FILM COATED ORAL ONCE
Refills: 0 | Status: COMPLETED | OUTPATIENT
Start: 2021-04-02 | End: 2021-04-02

## 2021-04-02 RX ADMIN — METHOCARBAMOL 1000 MILLIGRAM(S): 500 TABLET, FILM COATED ORAL at 11:36

## 2021-04-02 RX ADMIN — Medication 400 MILLIGRAM(S): at 11:36

## 2021-04-02 NOTE — ED PROVIDER NOTE - NSFOLLOWUPCLINICS_GEN_ALL_ED_FT
[facility] ([facility])  Rehabilitation  [facility] [address] [place] [address]  Phone: [phone]  Fax:   Follow Up Time: 1-3 Days

## 2021-04-02 NOTE — ED ADULT NURSE NOTE - HIV OFFER
olopatadine      Last Written Prescription Date: 8/19/16  Last Fill Quantity: 5,  # refills: 3   Last Office Visit with [facility], [facility] or [facility] prescribing provider: 11/14/16                                         Next 5 appointments (look out 90 days)     Mar 06, 2017  9:30 AM   Return Visit with Shayy Mathias MD   [facility]    [address] [address]   [phone]                No PCP listed, forwarding to [facility]   [phone]         Opt out

## 2021-04-02 NOTE — ED PROVIDER NOTE - OBJECTIVE STATEMENT
75 y.o F w/ pmhx HTN, CAD s/p PCI, COPD, DM, HLD p/w posterior neck pain x 1 month mostly worse in the morning when she wakes up. She hasn't taken any meds for neck pain however, hot packs have helped her. No fever, no numbness or tingling, no cp, no sob, no dizziness, no blurry vision, no trauma.

## 2021-04-02 NOTE — ED PROVIDER NOTE - ATTENDING CONTRIBUTION TO CARE
76 y/o F pmh as noted  p/w atraumatic, intermittent b/l neck pain x1mo.  worse w/ movement, better in some positions.  NO neuro deficit, cp, fever.  ROS PE above;  neck supple; non tender cspine, + b/l MM spasm; no crepitus; NL Neuro exam grossly.    IMP: MSK neck pain.  P: analgesia, antiemetic, reassess.

## 2021-04-02 NOTE — ED PROVIDER NOTE - PHYSICAL EXAMINATION
CONSTITUTIONAL: well-appearing, in NAD  SKIN: Warm dry, normal skin turgor  HEAD: NCAT  EYES: EOMI, PERRLA, no scleral icterus, conjunctiva pink  ENT: normal pharynx with no erythema or exudates  NECK: Supple; b/l muscular tenderness. Full ROM. hypertrophic b/l cervical muscles.   CARD: RRR, no murmurs.  RESP: clear to ausculation b/l. No crackles or wheezing.  ABD: soft, non-tender, non-distended, no rebound or guarding.  EXT: Full ROM, no bony tenderness, no pedal edema, no calf tenderness  NEURO: normal motor. normal sensory. CN II-XII intact. Cerebellar testing normal. Normal gait.  PSYCH: Cooperative, appropriate.

## 2021-04-02 NOTE — ED PROVIDER NOTE - PATIENT PORTAL LINK FT
You can access the FollowMyHealth Patient Portal offered by [facility] by registering at the following website: [email or web address]. By joining [employer]’s FollowMyHealth portal, you will also be able to view your health information using other applications (apps) compatible with our system.

## 2021-04-02 NOTE — ED PROVIDER NOTE - CLINICAL SUMMARY MEDICAL DECISION MAKING FREE TEXT BOX
pt w/ chronic atraumatic neck pain. pain improved in ED. Pt stable for dc w/ continued oupt w/up, pmd f/up, and care as discussed.  Pt understands plan and signs and symptoms for ED return. DC home.

## 2021-04-26 ENCOUNTER — APPOINTMENT (OUTPATIENT)
Dept: PODIATRY | Facility: CLINIC | Age: 76
End: 2021-04-26
Payer: MEDICARE

## 2021-04-26 ENCOUNTER — OUTPATIENT (OUTPATIENT)
Dept: OUTPATIENT SERVICES | Facility: HOSPITAL | Age: 76
LOS: 1 days | Discharge: HOME | End: 2021-04-26

## 2021-04-26 DIAGNOSIS — Z98.890 OTHER SPECIFIED POSTPROCEDURAL STATES: Chronic | ICD-10-CM

## 2021-04-26 DIAGNOSIS — E11.42 TYPE 2 DIABETES MELLITUS WITH DIABETIC POLYNEUROPATHY: ICD-10-CM

## 2021-04-26 PROCEDURE — 99203 OFFICE O/P NEW LOW 30 MIN: CPT

## 2021-04-26 NOTE — PHYSICAL EXAM
[General Appearance - Alert] : alert [Varicose Veins Of Lower Extremities] : present [2+] : left foot dorsalis pedis 2+ [Skin Color & Pigmentation] : normal skin color and pigmentation [Skin Turgor] : normal skin turgor [Vibration Dec.] : diminished vibratory sensation at the level of the toes [Oriented To Time, Place, And Person] : oriented to person, place, and time [Impaired Insight] : insight and judgment were intact [de-identified] : bilateral hammer toe deformity, left foot overlapping second toe [Diminished Throughout Right Foot] : normal sensation with monofilament testing throughout right foot [Diminished Throughout Left Foot] : normal sensation with monofilament testing throughout left foot

## 2021-04-26 NOTE — ASSESSMENT
[FreeTextEntry1] : -Loss of protective sensation: yes \par -Presence of foot deformity:  yes   \par -presence of pre-ulcerative lesion: no\par   -hemorrhage into callus: no\par -atrophy of heel or metatarsal fat pads:  no\par \par -Diabetic neurovascular foot assessment  performed. \par -Discussed with patient diabetic foot hygiene.Patient instructed to regularly check the bottom of the feet\par -Patient given a diabetic foot education sheet\par -Return 2 month\par

## 2021-04-26 NOTE — HISTORY OF PRESENT ILLNESS
[FreeTextEntry1] : 75 year old F  presents for a diabetic foot evaluation. Ms. SPENCER denies any tingling burning in her feet. Last A1c was 9% 1/2020\par

## 2021-05-21 ENCOUNTER — APPOINTMENT (OUTPATIENT)
Age: 76
End: 2021-05-21
Payer: MEDICARE

## 2021-05-21 VITALS
HEIGHT: 62 IN | SYSTOLIC BLOOD PRESSURE: 122 MMHG | DIASTOLIC BLOOD PRESSURE: 74 MMHG | WEIGHT: 177 LBS | RESPIRATION RATE: 14 BRPM | HEART RATE: 87 BPM | BODY MASS INDEX: 32.57 KG/M2 | OXYGEN SATURATION: 97 %

## 2021-05-21 DIAGNOSIS — G47.33 OBSTRUCTIVE SLEEP APNEA (ADULT) (PEDIATRIC): ICD-10-CM

## 2021-05-21 DIAGNOSIS — J45.909 UNSPECIFIED ASTHMA, UNCOMPLICATED: ICD-10-CM

## 2021-05-21 DIAGNOSIS — R06.02 SHORTNESS OF BREATH: ICD-10-CM

## 2021-05-21 PROCEDURE — 99204 OFFICE O/P NEW MOD 45 MIN: CPT | Mod: 25

## 2021-05-21 PROCEDURE — 71046 X-RAY EXAM CHEST 2 VIEWS: CPT

## 2021-05-21 RX ORDER — ALBUTEROL SULFATE 90 UG/1
108 (90 BASE) AEROSOL, METERED RESPIRATORY (INHALATION)
Qty: 1 | Refills: 6 | Status: ACTIVE | COMMUNITY
Start: 2021-05-21 | End: 1900-01-01

## 2021-05-21 NOTE — REASON FOR VISIT
[Initial] : an initial visit [Asthma] : asthma [Sleep Apnea] : sleep apnea [Shortness of Breath] : shortness of breath

## 2021-05-21 NOTE — HISTORY OF PRESENT ILLNESS
[Shortness of Breath] : Shortness of Breath [Dyspnea] : dyspnea [Fatigue] : fatigue [Chest Tightness] : chest tightness [Cough] : cough [Weight Gain] : weight gain [Leg Pain] : no leg pain [Edema] : no edema

## 2021-05-21 NOTE — DISCUSSION/SUMMARY
[FreeTextEntry1] : 1- SOB ON EXERTION \par 2- ASHTMA ON BREO. NEB\par 3- PORSHA NO SUPPLIES\par 4 WEIGHT LOSS\par \par PFT AND REGROUP

## 2021-06-01 NOTE — ED ADULT NURSE NOTE - DISCHARGE DATE/TIME
Please notify Ralph that her paperwork for school is complete and she may come pick this up.  
27-Feb-2018 22:50

## 2021-06-14 ENCOUNTER — OUTPATIENT (OUTPATIENT)
Dept: OUTPATIENT SERVICES | Facility: HOSPITAL | Age: 76
LOS: 1 days | Discharge: HOME | End: 2021-06-14

## 2021-06-14 DIAGNOSIS — Z98.890 OTHER SPECIFIED POSTPROCEDURAL STATES: Chronic | ICD-10-CM

## 2021-06-14 DIAGNOSIS — M54.2 CERVICALGIA: ICD-10-CM

## 2021-07-26 ENCOUNTER — APPOINTMENT (OUTPATIENT)
Dept: PODIATRY | Facility: CLINIC | Age: 76
End: 2021-07-26

## 2021-08-20 ENCOUNTER — APPOINTMENT (OUTPATIENT)
Age: 76
End: 2021-08-20

## 2021-12-21 ENCOUNTER — APPOINTMENT (OUTPATIENT)
Dept: OPHTHALMOLOGY | Facility: CLINIC | Age: 76
End: 2021-12-21

## 2021-12-21 ENCOUNTER — OUTPATIENT (OUTPATIENT)
Dept: OUTPATIENT SERVICES | Facility: HOSPITAL | Age: 76
LOS: 1 days | Discharge: HOME | End: 2021-12-21
Payer: COMMERCIAL

## 2021-12-21 DIAGNOSIS — Z98.890 OTHER SPECIFIED POSTPROCEDURAL STATES: Chronic | ICD-10-CM

## 2021-12-21 DIAGNOSIS — H25.11 AGE-RELATED NUCLEAR CATARACT, RIGHT EYE: ICD-10-CM

## 2021-12-21 DIAGNOSIS — H26.8 OTHER SPECIFIED CATARACT: ICD-10-CM

## 2021-12-21 DIAGNOSIS — E11.9 TYPE 2 DIABETES MELLITUS WITHOUT COMPLICATIONS: ICD-10-CM

## 2021-12-21 PROCEDURE — 92004 COMPRE OPH EXAM NEW PT 1/>: CPT

## 2022-01-18 ENCOUNTER — OUTPATIENT (OUTPATIENT)
Dept: OUTPATIENT SERVICES | Facility: HOSPITAL | Age: 77
LOS: 1 days | Discharge: HOME | End: 2022-01-18
Payer: MEDICARE

## 2022-01-18 ENCOUNTER — APPOINTMENT (OUTPATIENT)
Dept: OPHTHALMOLOGY | Facility: CLINIC | Age: 77
End: 2022-01-18

## 2022-01-18 DIAGNOSIS — Z98.890 OTHER SPECIFIED POSTPROCEDURAL STATES: Chronic | ICD-10-CM

## 2022-01-18 PROCEDURE — 92136 OPHTHALMIC BIOMETRY: CPT | Mod: 26

## 2022-01-26 DIAGNOSIS — H25.11 AGE-RELATED NUCLEAR CATARACT, RIGHT EYE: ICD-10-CM

## 2022-01-26 DIAGNOSIS — H26.8 OTHER SPECIFIED CATARACT: ICD-10-CM

## 2022-01-26 DIAGNOSIS — Z96.1 PRESENCE OF INTRAOCULAR LENS: ICD-10-CM

## 2022-01-26 DIAGNOSIS — Z98.42 CATARACT EXTRACTION STATUS, LEFT EYE: ICD-10-CM

## 2023-05-02 ENCOUNTER — EMERGENCY (EMERGENCY)
Facility: HOSPITAL | Age: 78
LOS: 0 days | Discharge: HOME | End: 2023-05-02
Attending: EMERGENCY MEDICINE
Payer: MEDICARE

## 2023-05-02 VITALS
SYSTOLIC BLOOD PRESSURE: 183 MMHG | TEMPERATURE: 98 F | DIASTOLIC BLOOD PRESSURE: 88 MMHG | RESPIRATION RATE: 18 BRPM | HEART RATE: 78 BPM | OXYGEN SATURATION: 98 % | HEIGHT: 62 IN | WEIGHT: 160.06 LBS

## 2023-05-02 DIAGNOSIS — Z98.890 OTHER SPECIFIED POSTPROCEDURAL STATES: Chronic | ICD-10-CM

## 2023-05-02 DIAGNOSIS — J44.9 CHRONIC OBSTRUCTIVE PULMONARY DISEASE, UNSPECIFIED: ICD-10-CM

## 2023-05-02 DIAGNOSIS — E78.5 HYPERLIPIDEMIA, UNSPECIFIED: ICD-10-CM
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
## 2023-05-02 DIAGNOSIS — R07.81 PLEURODYNIA: ICD-10-CM

## 2023-05-02 DIAGNOSIS — Z95.5 PRESENCE OF CORONARY ANGIOPLASTY IMPLANT AND GRAFT: ICD-10-CM

## 2023-05-02 DIAGNOSIS — I10 ESSENTIAL (PRIMARY) HYPERTENSION: ICD-10-CM

## 2023-05-02 DIAGNOSIS — E11.9 TYPE 2 DIABETES MELLITUS WITHOUT COMPLICATIONS: ICD-10-CM
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
## 2023-05-02 DIAGNOSIS — S20.20XA CONTUSION OF THORAX, UNSPECIFIED, INITIAL ENCOUNTER: ICD-10-CM

## 2023-05-02 DIAGNOSIS — Z79.02 LONG TERM (CURRENT) USE OF ANTITHROMBOTICS/ANTIPLATELETS: ICD-10-CM

## 2023-05-02 DIAGNOSIS — Z79.84 LONG TERM (CURRENT) USE OF ORAL HYPOGLYCEMIC DRUGS: ICD-10-CM

## 2023-05-02 DIAGNOSIS — Z79.82 LONG TERM (CURRENT) USE OF ASPIRIN: ICD-10-CM

## 2023-05-02 DIAGNOSIS — I25.10 ATHEROSCLEROTIC HEART DISEASE OF NATIVE CORONARY ARTERY WITHOUT ANGINA PECTORIS: ICD-10-CM

## 2023-05-02 PROCEDURE — 71101 X-RAY EXAM UNILAT RIBS/CHEST: CPT | Mod: 26,RT

## 2023-05-02 PROCEDURE — 99284 EMERGENCY DEPT VISIT MOD MDM: CPT

## 2023-05-02 PROCEDURE — 71101 X-RAY EXAM UNILAT RIBS/CHEST: CPT | Mod: RT

## 2023-05-02 PROCEDURE — 99283 EMERGENCY DEPT VISIT LOW MDM: CPT | Mod: 25

## 2023-05-02 RX ORDER — LIDOCAINE 4 G/100G
1 CREAM TOPICAL ONCE
Refills: 0 | Status: COMPLETED | OUTPATIENT
Start: 2023-05-02 | End: 2023-05-02

## 2023-05-02 RX ORDER — IBUPROFEN 200 MG
600 TABLET ORAL ONCE
Refills: 0 | Status: COMPLETED | OUTPATIENT
Start: 2023-05-02 | End: 2023-05-02

## 2023-05-02 RX ORDER — LIDOCAINE 4 G/100G
1 CREAM TOPICAL
Qty: 14 | Refills: 0
Start: 2023-05-02 | End: 2023-05-08

## 2023-05-02 RX ADMIN — LIDOCAINE 1 PATCH: 4 CREAM TOPICAL at 12:07

## 2023-05-02 RX ADMIN — Medication 600 MILLIGRAM(S): at 12:07

## 2023-05-02 NOTE — ED PROVIDER NOTE - PATIENT PORTAL LINK FT
You can access the FollowMyHealth Patient Portal offered by [facility] by registering at the following website: [email or web address]. By joining [employer]’s FollowMyHealth portal, you will also be able to view your health information using other applications (apps) compatible with our system.

## 2023-05-02 NOTE — ED PROVIDER NOTE - OBJECTIVE STATEMENT
77-year-old female with past medical history of hypertension, hyperlipidemia, diabetes, CAD status post PCI, COPD not on home O2 presenting for 1 week right anterior rib pain status post mechanical fall.  Patient states that she was getting up from bed to use the bathroom when she tripped over a container landing on her right ribs.  Patient endorses pain since this time but states she has not taken any pain medication.  Denies head trauma loss of consciousness anticoagulation headache vision changes dizziness numbness weakness tingling chest pain shortness of breath nausea vomiting diarrhea dysuria fever difficulty ambulating.  Patient lives at home with her daughter and uses a cane to ambulate while outside of the home but does not use a cane in home.

## 2023-05-02 NOTE — ED PROVIDER NOTE - CLINICAL SUMMARY MEDICAL DECISION MAKING FREE TEXT BOX
Rib contusion.  Supportive care.  Lidocaine.  Chest x-ray ordered rib series ordered and reviewed by me.

## 2023-05-02 NOTE — ED PROVIDER NOTE - PHYSICAL EXAMINATION
CONSTITUTIONAL: well-appearing, in NAD  SKIN: Warm dry, normal skin turgor  HEAD: NCAT; no raccoon eyes, hemotympanum, or livingston sign  EYES: EOMI, PERRLA, no scleral icterus, conjunctiva pink  ENT: normal pharynx with no erythema or exudates  NECK: Supple; non tender. Full ROM. no c/t/l/s tenderness  CARD: RRR, no murmurs. +anterior R rib pain just inferior to level of R breast  RESP: clear to ausculation b/l. No crackles or wheezing.  ABD: soft, non-tender, non-distended, no rebound or guarding.  EXT: Full ROM, no bony tenderness, no pedal edema, no calf tenderness; radial pulses 2+ b/l  NEURO: normal motor. normal sensory. Normal gait.  PSYCH: Cooperative, appropriate.

## 2023-05-02 NOTE — ED PROVIDER NOTE - ATTENDING CONTRIBUTION TO CARE
77-year-old female with multiple medical problems now status post injury to her right anterior rib status post fall.  Still painful.  No shortness of breath no chest pain no vomiting no nausea.  Ambulatory without difficulty.    On exam nontoxic well-appearing lungs clear no crepitus over chest wall.  Mild tenderness to anterior right lower rib without any step-off.  No discoloration.  No rashes.  Abdomen soft.  No hepatosplenomegaly.    X-ray, supportive care, reassess

## 2023-05-22 NOTE — PROVIDER CONTACT NOTE (MEDICATION) - ACTION/TREATMENT ORDERED:
Assessment & Plan       ICD-10-CM    1. Attention deficit hyperactivity disorder (ADHD), combined type  F90.2 amphetamine-dextroamphetamine (ADDERALL XR) 10 MG 24 hr capsule     Drug Abuse Screen Panel 13, Urine (Pain Care Package) - lab collect     Drug Abuse Screen Panel 13, Urine (Pain Care Package) - lab collect     PRIMARY CARE FOLLOW-UP SCHEDULING      2. Seasonal affective disorder (H)  F33.8 PRIMARY CARE FOLLOW-UP SCHEDULING      3. Environmental allergies  Z91.09 PRIMARY CARE FOLLOW-UP SCHEDULING      4. Medication monitoring encounter  Z51.81 PRIMARY CARE FOLLOW-UP SCHEDULING          Discussed treatment/modality options, including risk and benefits, he desires:    1) stop straterra    2) start low dose Adderall XR 10 m daily    3) CSA/UDS completed    4) clse follow up in 3-5 weeks, may be virtual    All diagnosis above reviewed and noted above, otherwise stable.      See [facility] orders for further details.      Return in about 3 weeks (around 6/12/2023) for Medication Recheck Visit, Follow Up Chronic.   Follow-up Visit   Expected date:  Jun 12, 2023 (Approximate)      Follow Up Appointment Details:     Follow-up with whom?: Me    Follow-Up for what?: Chronic Disease f/u    Chronic Disease f/u: General (Other)    How?: In Person or Virtual    Is this an as-needed follow-up?: No                  No LOS data to display    Doing chart review, history and exam, documentation and further activities as noted.           Loki Lea MD, FAAFP     [facility]  [address] [place] [address] [id number] [place], [place] [email or web address]   Office: [phone]  Fax: [phone]  Pager: [phone]     Jacinto Morales is a 16 year old, presenting for the following health issues:    Recheck Medication (Discuss possible change in medication.)        5/22/2023     1:34 PM   Additional Questions   Roomed by Kay Nguyễn   Accompanied by Mom,  "Regi      History of Present Illness       Reason for visit:  Mediction stuff and random abnormalities with my body.        ADHD Follow-Up    Date of last ADHD office visit: 08/29/22  Status since last visit: Worse, Andrew said he just does not feel like himself.  He feels \"quiet\", and groggy. He just does not seem to be able to focus as he would expect with the medication.  Taking controlled (daily) medications as prescribed: No, he was, but he has stopped taking the medication about a week or two ago.                      Parent/Patient Concerns with Medications: Current symptoms that had been described.    ADHD Medication     Attention-Deficit/Hyperactivity Disorder (ADHD) Agents Disp Start End     atomoxetine (STRATTERA) 40 MG capsule    90 capsule 4/11/2023     Sig: TAKE 1 CAPSULE BY MOUTH EVERY DAY    Class: [employer]        School:  Name of  : [employer]  Grade: 10th     Strattera felt like it dulled him, no improvement in focus, school he notes are going ok, A's, B's, and C's, procrastinates in certain subjects, overall GPA is 3.2    Competing in [employer] and kick boxing, weight lifting, working at a [employer] 10 hrs per week        Review of Systems   Constitutional, eye, ENT, skin, respiratory, cardiac, GI, MSK, neuro, and allergy are normal except as otherwise noted.      Objective    /61   Pulse 67   Temp 98.5  F (36.9  C) (Tympanic)   Resp 12   Ht 1.702 m (5' 7\")   Wt 68.4 kg (150 lb 14.4 oz)   SpO2 97%   BMI 23.63 kg/m    70 %ile (Z= 0.52) based on [facility] (Boys, 2-20 Years) weight-for-age data using vitals from 5/22/2023.  Blood pressure reading is in the normal blood pressure range based on the 2017 AAP Clinical Practice Guideline.    Physical Exam   GENERAL: Active, alert, in no acute distress.  SKIN: Clear. No significant rash, abnormal pigmentation or lesions  HEAD: Normocephalic.  EYES:  No discharge or erythema. Normal pupils and EOM.  EARS: Normal canals. Tympanic " membranes are normal; gray and translucent.  NOSE: Normal without discharge.  MOUTH/THROAT: Clear. No oral lesions. Teeth intact without obvious abnormalities.  NECK: Supple, no masses.  LYMPH NODES: No adenopathy  LUNGS: Clear. No rales, rhonchi, wheezing or retractions  HEART: Regular rhythm. Normal S1/S2. No murmurs.  ABDOMEN: Soft, non-tender, not distended, no masses or hepatosplenomegaly. Bowel sounds normal.      IOC will decide if to give a lesser dose

## 2023-09-21 NOTE — PATIENT PROFILE ADULT. - MEDICATION DISPOSITION, PROFILE
Anesthesia Post-op Note    Patient: Mitali Herman  Procedure(s) Performed: EGD (ESOPHAGOGASTRODUODENOSCOPY)  Anesthesia type: MAC    Vitals Value Taken Time   Temp See chart 09/21/23 1442   Pulse 79 09/21/23 1415   Resp 17 09/21/23 1415   SpO2 95 % 09/21/23 1415   /59 09/21/23 1415         Patient Location: Phase II  Post-op Vital Signs:stable  Level of Consciousness: awake and alert  Respiratory Status: spontaneous ventilation  Cardiovascular stable  Hydration: euvolemic  Pain Management: adequately controlled  Handoff: Handoff to receiving clinician was performed and questions were answered  Vomiting: none  Nausea: None  Airway Patency:patent  Post-op Assessment: no complications and patient tolerated procedure well      There were no known notable events for this encounter.  
bedside

## 2023-10-26 NOTE — PATIENT PROFILE ADULT. - MEDICATION ADMINISTRATION INFO, PROFILE
Detail Level: Detailed
Quality 226: Preventive Care And Screening: Tobacco Use: Screening And Cessation Intervention: Patient screened for tobacco use and is an ex/non-smoker
no concerns

## 2024-04-25 ENCOUNTER — EMERGENCY (EMERGENCY)
Facility: HOSPITAL | Age: 79
LOS: 0 days | Discharge: HOME | End: 2024-04-25
Attending: STUDENT IN AN ORGANIZED HEALTH CARE EDUCATION/TRAINING PROGRAM
Payer: MEDICARE

## 2024-04-25 VITALS
SYSTOLIC BLOOD PRESSURE: 184 MMHG | HEART RATE: 62 BPM | TEMPERATURE: 98 F | RESPIRATION RATE: 18 BRPM | DIASTOLIC BLOOD PRESSURE: 78 MMHG | OXYGEN SATURATION: 97 %

## 2024-04-25 DIAGNOSIS — E78.5 HYPERLIPIDEMIA, UNSPECIFIED: ICD-10-CM

## 2024-04-25 DIAGNOSIS — Z95.5 PRESENCE OF CORONARY ANGIOPLASTY IMPLANT AND GRAFT: ICD-10-CM

## 2024-04-25 DIAGNOSIS — Z98.890 OTHER SPECIFIED POSTPROCEDURAL STATES: Chronic | ICD-10-CM

## 2024-04-25 DIAGNOSIS — M25.531 PAIN IN RIGHT WRIST: ICD-10-CM

## 2024-04-25 DIAGNOSIS — M19.90 UNSPECIFIED OSTEOARTHRITIS, UNSPECIFIED SITE: ICD-10-CM

## 2024-04-25 DIAGNOSIS — J44.9 CHRONIC OBSTRUCTIVE PULMONARY DISEASE, UNSPECIFIED: ICD-10-CM

## 2024-04-25 DIAGNOSIS — I25.10 ATHEROSCLEROTIC HEART DISEASE OF NATIVE CORONARY ARTERY WITHOUT ANGINA PECTORIS: ICD-10-CM

## 2024-04-25 DIAGNOSIS — E11.9 TYPE 2 DIABETES MELLITUS WITHOUT COMPLICATIONS: ICD-10-CM

## 2024-04-25 DIAGNOSIS — I10 ESSENTIAL (PRIMARY) HYPERTENSION: ICD-10-CM

## 2024-04-25 PROCEDURE — 99283 EMERGENCY DEPT VISIT LOW MDM: CPT | Mod: 25

## 2024-04-25 PROCEDURE — 73110 X-RAY EXAM OF WRIST: CPT | Mod: RT

## 2024-04-25 PROCEDURE — 99284 EMERGENCY DEPT VISIT MOD MDM: CPT

## 2024-04-25 PROCEDURE — 73110 X-RAY EXAM OF WRIST: CPT | Mod: 26,RT

## 2024-04-25 RX ORDER — ACETAMINOPHEN 500 MG
975 TABLET ORAL ONCE
Refills: 0 | Status: COMPLETED | OUTPATIENT
Start: 2024-04-25 | End: 2024-04-25

## 2024-04-25 RX ORDER — IBUPROFEN 200 MG
800 TABLET ORAL ONCE
Refills: 0 | Status: COMPLETED | OUTPATIENT
Start: 2024-04-25 | End: 2024-04-25

## 2024-04-25 RX ORDER — DICLOFENAC SODIUM 30 MG/G
2 GEL TOPICAL
Qty: 1 | Refills: 0
Start: 2024-04-25 | End: 2024-05-01

## 2024-04-25 RX ADMIN — Medication 800 MILLIGRAM(S): at 14:00

## 2024-04-25 RX ADMIN — Medication 975 MILLIGRAM(S): at 14:01

## 2024-04-25 NOTE — ED PROVIDER NOTE - CLINICAL SUMMARY MEDICAL DECISION MAKING FREE TEXT BOX
78-year-old female history of hypertension, hyperlipidemia, diabetes, CAD status post PCI, COPD on home O2 presenting for right wrist pain x 2 days.  Patient states it is constant, worse with movement, better when immobilized, no preceding trauma, no fever, no swelling, no focal weakness numbness or tingling, no other joint pains, no vision changes, no rash.  Agree with exam as documented by resident above.  Additionally negative Finkelstein's test.  Patient has Heberden's and Jasmin nodes to the hands.  There is no warmth, no erythema, no edema.  Low suspicion for septic arthritis at this time.  No involvement of the digit to suggest flexor tenosynovitis.  X-ray obtained negative for fracture or dislocation.  Likely arthritic changes, possible component of rheumatoid arthritis.  Patient given follow-up with hand surgery, recommended supportive care.  Return precaution discussed.  Questions answered

## 2024-04-25 NOTE — ED PROVIDER NOTE - PATIENT PORTAL LINK FT
You can access the FollowMyHealth Patient Portal offered by [facility] by registering at the following website: [email or web address]. By joining [employer]’s FollowMyHealth portal, you will also be able to view your health information using other applications (apps) compatible with our system.

## 2024-04-25 NOTE — ED PROVIDER NOTE - NSICDXPASTSURGICALHX_GEN_ALL_CORE_FT
PAST SURGICAL HISTORY:  H/O shoulder surgery     History of cholecystectomy     History of hip surgery

## 2024-04-25 NOTE — ED PROVIDER NOTE - NSICDXPASTMEDICALHX_GEN_ALL_CORE_FT
PAST MEDICAL HISTORY:  Arthritis     Asthma     COPD (chronic obstructive pulmonary disease)     DM (diabetes mellitus)     HTN (hypertension)     Osteoporosis

## 2024-04-25 NOTE — ED PROVIDER NOTE - OBJECTIVE STATEMENT
Patient is a 63-patient is a 70-year-old female with past medical history of hypertension, hyperlipidemia, diabetes, CAD status post PCI, COPD not on home O2, arthritis presenting for wrist pain.  Patient says wrist pain has been on the right side, persistent for the past 2 days, at times worse than others.  Denies preceding trauma, repetitive hand motion.  Denies fever.  Denies swelling.  Patient has not taken anything for pain.  Patient otherwise well 78-year-old female with past medical history of hypertension, hyperlipidemia, diabetes, CAD status post PCI, COPD not on home O2, arthritis presenting for wrist pain.  Patient says wrist pain has been on the right side, persistent for the past 2 days, at times worse than others.  Denies preceding trauma, repetitive hand motion.  Denies fever.  Denies swelling.  Patient has not taken anything for pain.  Patient otherwise well

## 2024-04-25 NOTE — ED PROVIDER NOTE - NSFOLLOWUPINSTRUCTIONS_ED_ALL_ED_FT
Our Emergency Department Referral Coordinators will be reaching out to you in the next 24-48 hours from 9:00am to 5:00pm with a follow up appointment. Please expect a phone call from the hospital in that time frame. If you do not receive a call or if you have any questions or concerns, you can reach them at   [phone]      Arthritis    Arthritis is a term that is commonly used to refer to joint pain or joint disease. There are more than 100 types of arthritis.    CAUSES  The most common cause of this condition is wear and tear of a joint. Other causes include:    Gout.  Inflammation of a joint.  An infection of a joint.  Sprains and other injuries near the joint.  A drug reaction or allergic reaction.    In some cases, the cause may not be known.    SYMPTOMS  The main symptom of this condition is pain in the joint with movement. Other symptoms include:    Redness, swelling, or stiffness at a joint.  Warmth coming from the joint.  Fever.  Overall feeling of illness.    DIAGNOSIS  This condition may be diagnosed with a physical exam and tests, including:    Blood tests.  Urine tests.  Imaging tests, such as MRI, X-rays, or a CT scan.    Sometimes, fluid is removed from a joint for testing.    TREATMENT  Treatment for this condition may involve:    Treatment of the cause, if it is known.  Rest.  Raising (elevating) the joint.  Applying cold or hot packs to the joint.  Medicines to improve symptoms and reduce inflammation.  Injections of a steroid such as cortisone into the joint to help reduce pain and inflammation.    Depending on the cause of your arthritis, you may need to make lifestyle changes to reduce stress on your joint. These changes may include exercising more and losing weight.    HOME CARE INSTRUCTIONS  Medicines    Take over-the-counter and prescription medicines only as told by your health care provider.  Do not take aspirin to relieve pain if gout is suspected.    Activities    Rest your joint if told by your health care provider. Rest is important when your disease is active and your joint feels painful, swollen, or stiff.  Avoid activities that make the pain worse. It is important to balance activity with rest.  Exercise your joint regularly with range-of-motion exercises as told by your health care provider. Try doing low-impact exercise, such as:  Swimming.  Water aerobics.  Biking.  Walking.    Joint Care    If your joint is swollen, keep it elevated if told by your health care provider.   If your joint feels stiff in the morning, try taking a warm shower.  If directed, apply heat to the joint. If you have diabetes, do not apply heat without permission from your health care provider.  Put a towel between the joint and the hot pack or heating pad.  Leave the heat on the area for 20–30 minutes.  If directed, apply ice to the joint:  Put ice in a plastic bag.  Place a towel between your skin and the bag.  Leave the ice on for 20 minutes, 2–3 times per day.  Keep all follow-up visits as told by your health care provider. This is important.    SEEK MEDICAL CARE IF:  The pain gets worse.  You have a fever.    SEEK IMMEDIATE MEDICAL CARE IF:  You develop severe joint pain, swelling, or redness.  Many joints become painful and swollen.  You develop severe back pain.  You develop severe weakness in your leg.  You cannot control your bladder or bowels.    ADDITIONAL NOTES AND INSTRUCTIONS    Please follow up with your Primary MD in 24-48 hr.  Seek immediate medical care for any new/worsening signs or symptoms.     Wrist Pain, Adult    There are many things that can cause wrist pain. Some common causes include:    An injury to the wrist area, such as a sprain, strain, or fracture.  Overuse of the joint.  A condition that causes increased pressure on a nerve in the wrist (carpal tunnel syndrome).  Wear and tear of the joints that occurs with aging (osteoarthritis).  A variety of other types of arthritis.    Sometimes, the cause of wrist pain is not known. Often, the pain goes away when you follow instructions from your health care provider for relieving pain at home, such as resting or icing the wrist. If your wrist pain continues, it is important to tell your health care provider.    Follow these instructions at home:  Rest the wrist area for at least 48 hours or as long as told by your health care provider.  If a splint or elastic bandage has been applied, use it as told by your health care provider.    Remove the splint or bandage only as told by your health care provider.  Loosen the splint or bandage if your fingers tingle, become numb, or turn cold or blue.    ImageIf directed, apply ice to the injured area.    If you have a removable splint or elastic bandage, remove it as told by your health care provider.  Put ice in a plastic bag.  Place a towel between your skin and the bag or between your splint or bandage and the bag.  Leave the ice on for 20 minutes, 2–3 times a day.    Keep your arm raised (elevated) above the level of your heart while you are sitting or lying down.  Take over-the-counter and prescription medicines only as told by your health care provider.  Keep all follow-up visits as told by your health care provider. This is important.  Contact a health care provider if:  You have a sudden sharp pain in the wrist, hand, or arm that is different or new.  The swelling or bruising on your wrist or hand gets worse.  Your skin becomes red, gets a rash, or has open sores.  Your pain does not get better or it gets worse.  Get help right away if:  You lose feeling in your fingers or hand.  Your fingers turn white, very red, or cold and blue.  You cannot move your fingers.  You have a fever or chills.  This information is not intended to replace advice given to you by your health care provider. Make sure you discuss any questions you have with your health care provider.

## 2024-04-25 NOTE — ED PROVIDER NOTE - PHYSICAL EXAMINATION
CONSTITUTIONAL: Well-developed; well-nourished; NAD  SKIN: warm, dry, w/o rash  HEAD: NCAT  EYES: PERRLA, EOMI, no conjunctival injection  ENT: No nasal discharge; nl OP without erythema or exudates  NECK: Supple, non-tender  CARD: nl S1, S2; RRR, no MRG, no JVD  RESP: CTAB, normal respiratory effort  ABD: BS+, soft, NTND, no HSM  EXT: Normal ROM.  No clubbing, cyanosis or edema; No erythema, warmth, swelling, crepitus to joint. Compartment soft. No pain with passive flexion or extension of the wrist or fingers  NEURO: Alert, oriented, grossly unremarkable  PSYCH: Cooperative, appropriate

## 2024-04-25 NOTE — ED PROVIDER NOTE - CARE PROVIDER_API CALL
Sebastian Greco  Orthopaedic Surgery  [address] [place] [address]  Phone: [phone]  Fax: [phone]  Established Patient  Follow Up Time: 1-3 Days

## 2024-07-16 ENCOUNTER — APPOINTMENT (OUTPATIENT)
Dept: ORTHOPEDIC SURGERY | Facility: CLINIC | Age: 79
End: 2024-07-16
Payer: MEDICARE

## 2024-07-16 DIAGNOSIS — G56.03 CARPAL TUNNEL SYNDROM,BILATERAL UPPER LIMBS: ICD-10-CM

## 2024-07-16 DIAGNOSIS — M65.311 TRIGGER THUMB, RIGHT THUMB: ICD-10-CM

## 2024-07-16 DIAGNOSIS — M65.312 TRIGGER THUMB, LEFT THUMB: ICD-10-CM

## 2024-07-16 PROCEDURE — 20550 NJX 1 TENDON SHEATH/LIGAMENT: CPT

## 2024-07-16 PROCEDURE — 73130 X-RAY EXAM OF HAND: CPT | Mod: 50

## 2024-07-16 PROCEDURE — 99204 OFFICE O/P NEW MOD 45 MIN: CPT | Mod: 25

## 2024-07-17 PROBLEM — G56.03 CARPAL TUNNEL SYNDROME, BILATERAL: Status: ACTIVE | Noted: 2024-07-17

## 2024-07-17 PROBLEM — M65.311 TRIGGER THUMB, RIGHT THUMB: Status: ACTIVE | Noted: 2024-07-17

## 2024-07-17 PROBLEM — M65.312 TRIGGER THUMB, LEFT THUMB: Status: ACTIVE | Noted: 2024-07-17

## 2024-07-31 ENCOUNTER — APPOINTMENT (OUTPATIENT)
Dept: NEUROLOGY | Facility: CLINIC | Age: 79
End: 2024-07-31
Payer: MEDICARE

## 2024-07-31 PROCEDURE — 95886 MUSC TEST DONE W/N TEST COMP: CPT

## 2024-07-31 PROCEDURE — 95912 NRV CNDJ TEST 11-12 STUDIES: CPT

## 2024-08-16 ENCOUNTER — APPOINTMENT (OUTPATIENT)
Dept: ORTHOPEDIC SURGERY | Facility: CLINIC | Age: 79
End: 2024-08-16

## 2024-08-20 ENCOUNTER — APPOINTMENT (OUTPATIENT)
Dept: ORTHOPEDIC SURGERY | Facility: CLINIC | Age: 79
End: 2024-08-20
Payer: MEDICARE

## 2024-08-20 DIAGNOSIS — G56.03 CARPAL TUNNEL SYNDROM,BILATERAL UPPER LIMBS: ICD-10-CM

## 2024-08-20 DIAGNOSIS — M65.312 TRIGGER THUMB, LEFT THUMB: ICD-10-CM

## 2024-08-20 DIAGNOSIS — M65.311 TRIGGER THUMB, RIGHT THUMB: ICD-10-CM

## 2024-08-20 PROCEDURE — 99213 OFFICE O/P EST LOW 20 MIN: CPT

## 2024-08-20 NOTE — ASSESSMENT
[FreeTextEntry1] : Patient comes in for follow-up.  She says the braces are helping her at nighttime.  She says her thumbs are doing well.  She is functioning.  She still unable to bend the left thumb.  The right thumb still clicks on her.  B/L hand:  Tender volar wrist  Good finger ROM  +Tinels  +Phalens  +Compression test  Normal sensation median nerve distribution  B/L hand:  Mild swelling  Tender 1st A1 pulley  Decreased thumb ROM  +thumb triggering  The patient was advised of the diagnosis.  The natural history of the pathology was explained in full to the patient in layman's terms. We discussed the nature of the nerve as an electrical cable and what happens to the nerve in carpal tunnel syndrome.  We discussed that treatment for night symptoms included night bracing.  We discussed the possibility of injection when symptoms were intermittent or in patients who were unwilling to undergo surgery with constant symptoms.  We discussed that injection is a diagnostic and therapeutic aide and what this means.  We discussed the use of nerve testing in cases when diagnosis was in doubt or for confirmation to exclude alternate pathology.  We discussed that if symptoms were 24/7 surgery was recommended to give the nerve the best chance to recover but that once symptoms were constant, the nerve may not recover even with surgery.  We discussed that if left alone the nerve progression could worsen and that treatment was indicated to prevent progression of nerve compression.  The longer the nerve is left, the more likely to cause worsening irreversible damage.  All questions were answered.  The risks and benefits of surgical and non-surgical treatment alternatives were explained in full to the patient. I believe the patient's hand pain and nighttime pain is secondary to carpal tunnel.  The patient will continue with the cock-up wrist splints as they are helping her.  She will follow-up back as needed for this.  My impression is that this patient has stenosing tenosynovitis of the finger, more commonly known as a trigger finger.  I recommended that the patient undergo a trigger finger injection.  Patient states she has no interested in another injection today.  I also offered her surgical intervention for the left thumb as it is not bending.  She says she is functional the way it is.  She has no interest in surgery.  She has no interested in injections.  She will follow-up as needed.

## 2025-03-05 ENCOUNTER — EMERGENCY (EMERGENCY)
Facility: HOSPITAL | Age: 80
LOS: 0 days | Discharge: HOME | End: 2025-03-05
Attending: STUDENT IN AN ORGANIZED HEALTH CARE EDUCATION/TRAINING PROGRAM
Payer: MEDICARE

## 2025-03-05 VITALS
OXYGEN SATURATION: 97 % | HEIGHT: 62 IN | SYSTOLIC BLOOD PRESSURE: 211 MMHG | WEIGHT: 175.05 LBS | TEMPERATURE: 98 F | RESPIRATION RATE: 20 BRPM | HEART RATE: 78 BPM | DIASTOLIC BLOOD PRESSURE: 72 MMHG

## 2025-03-05 DIAGNOSIS — R10.11 RIGHT UPPER QUADRANT PAIN: ICD-10-CM

## 2025-03-05 DIAGNOSIS — J44.9 CHRONIC OBSTRUCTIVE PULMONARY DISEASE, UNSPECIFIED: ICD-10-CM

## 2025-03-05 DIAGNOSIS — Z98.890 OTHER SPECIFIED POSTPROCEDURAL STATES: Chronic | ICD-10-CM

## 2025-03-05 DIAGNOSIS — E11.9 TYPE 2 DIABETES MELLITUS WITHOUT COMPLICATIONS: ICD-10-CM

## 2025-03-05 DIAGNOSIS — I10 ESSENTIAL (PRIMARY) HYPERTENSION: ICD-10-CM

## 2025-03-05 DIAGNOSIS — R10.31 RIGHT LOWER QUADRANT PAIN: ICD-10-CM

## 2025-03-05 PROCEDURE — 99284 EMERGENCY DEPT VISIT MOD MDM: CPT

## 2025-03-05 PROCEDURE — 99283 EMERGENCY DEPT VISIT LOW MDM: CPT

## 2025-03-05 RX ORDER — PREDNISONE 20 MG/1
1 TABLET ORAL
Qty: 5 | Refills: 0
Start: 2025-03-05 | End: 2025-03-09

## 2025-03-05 RX ORDER — ACETAMINOPHEN 500 MG/5ML
975 LIQUID (ML) ORAL ONCE
Refills: 0 | Status: COMPLETED | OUTPATIENT
Start: 2025-03-05 | End: 2025-03-05

## 2025-03-05 RX ADMIN — Medication 975 MILLIGRAM(S): at 20:31

## 2025-03-05 NOTE — ED PROVIDER NOTE - CLINICAL SUMMARY MEDICAL DECISION MAKING FREE TEXT BOX
pt with shingles on the right side x 3 days    Appropriate medications for patient's presenting complaints were ordered and effects were reassessed. Patient's external records were reviewed    Escalation to admission and/or observation was considered.  Patient feels much better and is comfortable with discharge.  Appropriate follow-up was arranged.    Antivirals

## 2025-03-05 NOTE — ED PROVIDER NOTE - PHYSICAL EXAMINATION
GENERAL: Well-nourished, Well-developed. NAD.  HEAD: NCAT  ENMT: MMM.   CVS: Normal S1,S2. No murmurs appreciated on auscultation   RESP: No use of accessory muscles. Chest rise symmetrical with good expansion. Lungs clear to auscultation B/L. No wheezing, rales, or rhonchi auscultated.  GI: Normal auscultation of bowel sounds in all 4 quadrants. Soft, Nontender, Nondistended. No guarding or rebound tenderness. No CVAT B/L.  Skin: (+)vesicular rash in T5 dermatomal distribution to R abdomen to R back.

## 2025-03-05 NOTE — ED ADULT NURSE NOTE - OBJECTIVE STATEMENT
pt presented to ED c/o R sided abdominal "burning sensation". upon assessment by DELMIS Silva, shingles noted on RUQ radiating to back. denies any recent fevers, chest pain, sob, n/v/d

## 2025-03-05 NOTE — ED ADULT NURSE NOTE - NSFALLUNIVINTERV_ED_ALL_ED
Bed/Stretcher in lowest position, wheels locked, appropriate side rails in place/Call bell, personal items and telephone in reach/Instruct patient to call for assistance before getting out of bed/chair/stretcher/Non-slip footwear applied when patient is off stretcher/[place] to call system/Physically safe environment - no spills, clutter or unnecessary equipment/Purposeful proactive rounding/Room/bathroom lighting operational, light cord in reach

## 2025-03-05 NOTE — ED PROVIDER NOTE - PATIENT PORTAL LINK FT
You can access the FollowMyHealth Patient Portal offered by [facility] by registering at the following website: [email or web address]. By joining [facility]’s FollowMyHealth portal, you will also be able to view your health information using other applications (apps) compatible with our system.

## 2025-03-05 NOTE — ED ADULT TRIAGE NOTE - HEIGHT IN FEET
Chief Complaint   Patient presents with    Eye Problem     Onset: x4days  Site: bilateral   Sxs: redness, watery, swollen   Vision changes: No   Glasses/lenses wear: Yes, glasses   Meds: No  Conjunctivitis exposure: No      Pt here with red, watery eyes x 4 days  Both eyes are bloodshot with yellowish color in the conjunctiva  With watery drainage.  Just got back from [place]    Eye Problem   Both eyes are affected. This is a new problem. The current episode started in the past 7 days. The problem has been gradually worsening. There was no injury mechanism. The patient is experiencing no pain. Associated symptoms include an eye discharge and eye redness. Pertinent negatives include no blurred vision, double vision, fever, foreign body sensation, itching, nausea, photophobia, recent URI or vomiting.       Review of Systems   Constitutional:  Negative for fever.   Eyes:  Positive for discharge and redness. Negative for blurred vision, double vision, photophobia and itching.   Gastrointestinal:  Negative for nausea and vomiting.   All other systems reviewed and are negative.      Vitals:    05/21/24 1025   BP: (!) 147/83   BP Location: LUE - Left upper extremity   Patient Position: Sitting   Cuff Size: Regular   Pulse: 76   Temp: 98.9 °F (37.2 °C)   TempSrc: Oral   SpO2: 98%   Weight: 85.9 kg (189 lb 6 oz)   Height: 5' 10\" (1.778 m)       Physical Exam  Vitals and nursing note reviewed.   Constitutional:       Appearance: Normal appearance. He is normal weight.   Eyes:      Conjunctiva/sclera:      Right eye: Hemorrhage present.      Left eye: Hemorrhage present.   Cardiovascular:      Rate and Rhythm: Normal rate.   Pulmonary:      Effort: Pulmonary effort is normal.   Musculoskeletal:         General: Normal range of motion.   Skin:     General: Skin is warm.      Capillary Refill: Capillary refill takes less than 2 seconds.   Neurological:      General: No focal deficit present.      Mental Status: He is alert and  oriented to person, place, and time.   Psychiatric:         Mood and Affect: Mood normal.         Behavior: Behavior normal.         Thought Content: Thought content normal.         Judgment: Judgment normal.         No results found for this visit on 05/21/24.    Markus was seen today for eye problem.    Diagnoses and all orders for this visit:    Non-traumatic subconjunctival hemorrhage of both eyes    Not sure if this is a subconjunctival hemorrhage or something else  Recommended pt to see his eye doctor today  Pt will f/u    No follow-ups on file.    The plan was discussed verbally and key components were summarized in the discharge instructions and printed on the AVS. All questions were answered. In discussing management and follow-up, they are advised that the plan is based only on information available at the time of this evaluation. Additional testing may be necessary, and outpatient evaluation is frequently required to ensure complete care. At the same time, there is always potential for symptoms to recur or worsen, or for additional signs or symptoms to develop that could substantially affect the treatment plan. If any new or uncontrolled symptoms occur, or if there are any other concerns, they are advised to seek medical evaluation immediately.     5

## 2025-03-05 NOTE — ED PROVIDER NOTE - OBJECTIVE STATEMENT
79-year-old female PMH COPD, DM, HTN presenting to ED for evaluation of right sided abdominal pain.  Patient is reporting burning-like sensation to right upper quadrant radiating to right upper back that has been going on for the past 4 days.  Denying any modifying factors.  Unrelated to eating.  Denies any fever, chills, nausea, vomiting, diarrhea, chest pain, shortness of breath.

## 2025-03-05 NOTE — ED ADULT TRIAGE NOTE - HEIGHT IN INCHES
2
Alert and oriented to person, place, time/situation. normal mood and affect. no apparent risk to self or others.

## 2025-03-28 ENCOUNTER — APPOINTMENT (OUTPATIENT)
Dept: ORTHOPEDIC SURGERY | Facility: CLINIC | Age: 80
End: 2025-03-28

## 2025-03-28 VITALS — BODY MASS INDEX: 29.26 KG/M2 | WEIGHT: 160 LBS

## 2025-03-28 DIAGNOSIS — S69.92XA UNSPECIFIED INJURY OF LEFT WRIST, HAND AND FINGER(S), INITIAL ENCOUNTER: ICD-10-CM

## 2025-03-28 DIAGNOSIS — S52.552A OTHER EXTRAARTICULAR FRACTURE OF LOWER END OF LEFT RADIUS, INITIAL ENCOUNTER FOR CLOSED FRACTURE: ICD-10-CM

## 2025-03-28 PROCEDURE — 73110 X-RAY EXAM OF WRIST: CPT | Mod: LT

## 2025-03-28 PROCEDURE — 99213 OFFICE O/P EST LOW 20 MIN: CPT | Mod: 25

## 2025-03-28 PROCEDURE — 29075 APPL CST ELBW FNGR SHORT ARM: CPT | Mod: LT

## 2025-04-15 ENCOUNTER — APPOINTMENT (OUTPATIENT)
Dept: ORTHOPEDIC SURGERY | Facility: CLINIC | Age: 80
End: 2025-04-15

## 2025-04-15 DIAGNOSIS — S69.92XA UNSPECIFIED INJURY OF LEFT WRIST, HAND AND FINGER(S), INITIAL ENCOUNTER: ICD-10-CM

## 2025-04-15 PROCEDURE — 99212 OFFICE O/P EST SF 10 MIN: CPT | Mod: 25

## 2025-04-15 PROCEDURE — 73110 X-RAY EXAM OF WRIST: CPT | Mod: LT
